# Patient Record
Sex: MALE | Race: WHITE | Employment: OTHER | URBAN - METROPOLITAN AREA
[De-identification: names, ages, dates, MRNs, and addresses within clinical notes are randomized per-mention and may not be internally consistent; named-entity substitution may affect disease eponyms.]

---

## 2017-02-27 ENCOUNTER — TRANSFERRED RECORDS (OUTPATIENT)
Dept: HEALTH INFORMATION MANAGEMENT | Facility: CLINIC | Age: 57
End: 2017-02-27

## 2017-05-23 ENCOUNTER — TELEPHONE (OUTPATIENT)
Dept: FAMILY MEDICINE | Facility: CLINIC | Age: 57
End: 2017-05-23

## 2017-05-23 DIAGNOSIS — K21.9 GASTROESOPHAGEAL REFLUX DISEASE WITHOUT ESOPHAGITIS: ICD-10-CM

## 2017-05-23 DIAGNOSIS — I10 ESSENTIAL HYPERTENSION, BENIGN: ICD-10-CM

## 2017-05-24 NOTE — TELEPHONE ENCOUNTER
Lisinopril      Last Written Prescription Date: 04/28/2016  Last Fill Quantity: 90, # refills: 3  Last Office Visit with [facility], [facility] or [facility] prescribing provider: 04/28/2016       Potassium   Date Value Ref Range Status   04/28/2016 3.9 3.4 - 5.3 mmol/L Final     Creatinine   Date Value Ref Range Status   04/28/2016 0.88 0.66 - 1.25 mg/dL Final     BP Readings from Last 3 Encounters:   04/28/16 130/87   04/23/15 154/87   01/15/15 129/87         Omeprazole      Last Written Prescription Date: 04/28/2016  Last Fill Quantity: 90,  # refills: 3   Last Office Visit with [facility], [facility] or [facility] prescribing provider: 04/28/2016

## 2017-05-30 RX ORDER — LISINOPRIL 20 MG/1
TABLET ORAL
Qty: 30 TABLET | Refills: 0 | Status: SHIPPED | OUTPATIENT
Start: 2017-05-30 | End: 2017-06-29

## 2017-05-30 RX ORDER — OMEPRAZOLE 40 MG/1
CAPSULE, DELAYED RELEASE ORAL
Qty: 30 CAPSULE | Refills: 0 | Status: SHIPPED | OUTPATIENT
Start: 2017-05-30 | End: 2017-06-29

## 2017-05-30 NOTE — TELEPHONE ENCOUNTER
Patient is overdue for an appointment.  Called patient and notified that can send one month refill and then needs follow up in clinic before next refill is needed.    Mili Underwood RN

## 2017-05-30 NOTE — TELEPHONE ENCOUNTER
Pt is calling because he is almost out of medication   Wanting filled ASAP    Kristin Berkowitz  [facility]

## 2017-06-29 DIAGNOSIS — K21.9 GASTROESOPHAGEAL REFLUX DISEASE WITHOUT ESOPHAGITIS: ICD-10-CM

## 2017-06-29 DIAGNOSIS — I10 ESSENTIAL HYPERTENSION, BENIGN: ICD-10-CM

## 2017-06-29 NOTE — TELEPHONE ENCOUNTER
Lisinopril      Last Written Prescription Date: 5/20/2017  Last Fill Quantity: 30, # refills: 0  Last Office Visit with [facility], [facility] or  [facility] prescribing provider: 4/28/2016  Next 5 appointments (look out 90 days)     Jul 07, 2017  2:40 PM CDT   [facility]    [address] [address]   [phone]                   Potassium   Date Value Ref Range Status   04/28/2016 3.9 3.4 - 5.3 mmol/L Final     Creatinine   Date Value Ref Range Status   04/28/2016 0.88 0.66 - 1.25 mg/dL Final     BP Readings from Last 3 Encounters:   04/28/16 130/87   04/23/15 154/87   01/15/15 129/87       Omeprazole      Last Written Prescription Date: 5/30/2017  Last Fill Quantity: 30,  # refills: 0   Last Office Visit with [facility], [facility] or [facility] prescribing provider: 4/28/2016                                         Next 5 appointments (look out 90 days)     Jul 07, 2017  2:40 PM CDT   [facility]    [address] [address]   [phone]                  Pt has an appt set up for 7/7/2017 with Dr. Fitzpatrick

## 2017-06-30 RX ORDER — OMEPRAZOLE 40 MG/1
CAPSULE, DELAYED RELEASE ORAL
Qty: 30 CAPSULE | Refills: 0 | Status: SHIPPED | OUTPATIENT
Start: 2017-06-30 | End: 2017-08-07

## 2017-06-30 RX ORDER — LISINOPRIL 20 MG/1
20 TABLET ORAL DAILY
Qty: 30 TABLET | Refills: 11 | Status: SHIPPED | OUTPATIENT
Start: 2017-06-30 | End: 2017-07-07

## 2017-06-30 NOTE — TELEPHONE ENCOUNTER
Pt is requesting a refill of the below medications. He has already been given the one month tita refill that our refill protocol allows. This refill request will be sent to the provider for review.     Please advise. The pt has scheduled an appt for 7/7/17.   Thank you,  Daya Higgins RN

## 2017-07-07 ENCOUNTER — OFFICE VISIT (OUTPATIENT)
Dept: FAMILY MEDICINE | Facility: CLINIC | Age: 57
End: 2017-07-07
Payer: COMMERCIAL

## 2017-07-07 VITALS
HEIGHT: 72 IN | WEIGHT: 262 LBS | BODY MASS INDEX: 35.49 KG/M2 | DIASTOLIC BLOOD PRESSURE: 74 MMHG | HEART RATE: 97 BPM | SYSTOLIC BLOOD PRESSURE: 118 MMHG

## 2017-07-07 DIAGNOSIS — M17.12 PRIMARY OSTEOARTHRITIS OF LEFT KNEE: ICD-10-CM

## 2017-07-07 DIAGNOSIS — M19.071 PRIMARY OSTEOARTHRITIS OF RIGHT ANKLE: Primary | ICD-10-CM

## 2017-07-07 DIAGNOSIS — I10 ESSENTIAL HYPERTENSION, BENIGN: ICD-10-CM

## 2017-07-07 LAB
CREAT SERPL-MCNC: 0.87 MG/DL (ref 0.66–1.25)
GFR SERPL CREATININE-BSD FRML MDRD: 89 ML/MIN/1.7M2
POTASSIUM SERPL-SCNC: 4.1 MMOL/L (ref 3.4–5.3)

## 2017-07-07 PROCEDURE — 82565 ASSAY OF CREATININE: CPT | Performed by: FAMILY MEDICINE

## 2017-07-07 PROCEDURE — 99214 OFFICE O/P EST MOD 30 MIN: CPT | Mod: 25 | Performed by: FAMILY MEDICINE

## 2017-07-07 PROCEDURE — 20605 DRAIN/INJ JOINT/BURSA W/O US: CPT | Mod: RT | Performed by: FAMILY MEDICINE

## 2017-07-07 PROCEDURE — 84132 ASSAY OF SERUM POTASSIUM: CPT | Performed by: FAMILY MEDICINE

## 2017-07-07 PROCEDURE — 36415 COLL VENOUS BLD VENIPUNCTURE: CPT | Performed by: FAMILY MEDICINE

## 2017-07-07 PROCEDURE — 20610 DRAIN/INJ JOINT/BURSA W/O US: CPT | Mod: LT | Performed by: FAMILY MEDICINE

## 2017-07-07 RX ORDER — TRIAMCINOLONE ACETONIDE 40 MG/ML
40 INJECTION, SUSPENSION INTRA-ARTICULAR; INTRAMUSCULAR ONCE
Qty: 1 ML | Refills: 0 | OUTPATIENT
Start: 2017-07-07 | End: 2017-07-07

## 2017-07-07 RX ORDER — LISINOPRIL 20 MG/1
20 TABLET ORAL DAILY
Qty: 90 TABLET | Refills: 3 | Status: SHIPPED | OUTPATIENT
Start: 2017-07-07 | End: 2018-08-26

## 2017-07-07 NOTE — PROGRESS NOTES
SUBJECTIVE:                                                    Tip Hernandez Jr. is a 57 year old male who presents to clinic today for the following health issues:      Hypertension Follow-up      Outpatient blood pressures were about 115-120/75.     Low Salt Diet: no added salt      Amount of exercise or physical activity: 2-3 days/week for an average of 30-45 minutes    Problems taking medications regularly: No    Medication side effects: none    Diet: regular (no restrictions)      He has a hx of DJD in the left knee and the right ankle. He has had imaging for these at outside facilities.   The pain in these joints has been 9 months ago for the ankle. The knee has been painful off and on for 2 years.   He has used knee pads.     Current Outpatient Prescriptions:      lisinopril (PRINIVIL/ZESTRIL) 20 MG tablet, Take 1 tablet (20 mg) by mouth daily, Disp: 30 tablet, Rfl: 11     omeprazole (PRILOSEC) 40 MG capsule, TAKE ONE CAPSULE BY MOUTH DAILY 30-60 MINUTES BEFO RE A MEAL, Disp: 30 capsule, Rfl: 0     MULTI-DAY VITAMINS OR TABS, 1 daily, Disp: , Rfl:      TYLENOL 500 MG OR TABS, 2 tabs b3placx, Disp: , Rfl:      augmented betamethasone dipropionate (DIPROLENE-AF) 0.05 % cream, Apply thin film to affected area twice daily, as needed. Pt will call to order, Disp: 30 g, Rfl: 10    Patient Active Problem List   Diagnosis     Essential hypertension, benign     Obesity     Hyperlipidemia with target LDL less than 130     RA (rheumatoid arthritis) (H)     Osteoarthritis     Eczema     DJD (degenerative joint disease), lumbar     DJD (degenerative joint disease) of cervical spine     GERD (gastroesophageal reflux disease)       Blood pressure 118/74, pulse 97, height 6' (1.829 m), weight 262 lb (118.8 kg).    Exam:  GENERAL APPEARANCE: healthy, alert and no distress  EYES: EOMI,  PERRL  NECK: no adenopathy, no asymmetry, masses, or scars and thyroid normal to palpation  RESP: lungs clear to auscultation - no  rales, rhonchi or wheezes  CV: regular rates and rhythm, normal S1 S2, no S3 or S4 and no murmur, click or rub -  MS: arthritis of the left knee and the right ankle with swelling and tenderness.   SKIN: no suspicious lesions or rashes  PSYCH: mentation appears normal and affect normal/bright      (I10) Essential hypertension, benign  Comment:   Plan: lisinopril (PRINIVIL/ZESTRIL) 20 MG tablet,         **Creatinine FUTURE anytime, **Potassium FUTURE        anytime        Monitor and record the BP readings at rest and the goal for the average is under 130/80. Use the med and the non drug therapies.   If doing well then refill and recheck annually and do the labs today.     (M17.12) Primary osteoarthritis of left knee  Comment:   Plan: DRAIN/INJECT LARGE JOINT/BURSA        We discussed the options and will use the injection of 1 cc of Kenalog-40, with 1 cc of 1% Lidocaine into the left knee superior and medial compartment.   Modify activities to avoid squatting and kneeling. Use the ice and Tylenol and advil. Follow up as needed.       (M19.071) Primary osteoarthritis of right ankle    Comment:   Plan: DRAIN/INJECT LARGE JOINT/BURSA        See above. The same med is injected sterily into the right ankle, lateral and superior aspect.       Danial Fitzpatrick

## 2017-07-07 NOTE — MR AVS SNAPSHOT
After Visit Summary   7/7/2017    Tip Hernandez Jr.    MRN: [id number]           Patient Information     Date Of Birth          1960        Visit Information        Provider Department      7/7/2017 2:40 PM Danial Fitzpatrick MD [facility]        Today's Diagnoses     Primary osteoarthritis of right ankle    -  1    Essential hypertension, benign        Primary osteoarthritis of left knee          Care Instructions    (I10) Essential hypertension, benign  Comment:   Plan: lisinopril (PRINIVIL/ZESTRIL) 20 MG tablet,         **Creatinine FUTURE anytime, **Potassium FUTURE        anytime        Monitor and record the BP readings at rest and the goal for the average is under 130/80. Use the med and the non drug therapies.   If doing well then refill and recheck annually and do the labs today.     (M17.12) Primary osteoarthritis of left knee  Comment:   Plan: DRAIN/INJECT LARGE JOINT/BURSA        We discussed the options and will use the injection of 1 cc of Kenalog-40, with 1 cc of 1% Lidocaine into the left knee superior and medial compartment.   Modify activities to avoid squatting and kneeling. Use the ice and Tylenol and advil. Follow up as needed.       (M19.071) Primary osteoarthritis of right ankle    Comment:   Plan: DRAIN/INJECT LARGE JOINT/BURSA        See above. The same med is injected sterily into the right ankle, lateral and superior aspect.           Follow-ups after your visit        Future tests that were ordered for you today     Open Future Orders        Priority Expected Expires Ordered    **Creatinine FUTURE anytime Routine 7/7/2017 7/7/2018 7/7/2017    **Potassium FUTURE anytime Routine 7/7/2017 7/7/2018 7/7/2017            Who to contact     If you have questions or need follow up information about today's clinic visit or your schedule please contact [facility] directly at [phone].  Normal or non-critical lab and imaging results will be  "communicated to you by MyChart, letter or phone within 4 business days after the clinic has received the results. If you do not hear from us within 7 days, please contact the clinic through [employer] or phone. If you have a critical or abnormal lab result, we will notify you by phone as soon as possible.  Submit refill requests through [employer] or call your pharmacy and they will forward the refill request to us. Please allow 3 business days for your refill to be completed.          Additional Information About Your Visit        [employer] Information     [employer] lets you send messages to your doctor, view your test results, renew your prescriptions, schedule appointments and more. To sign up, go to [email or web address] . Click on \"Log in\" on the left side of the screen, which will take you to the Welcome page. Then click on \"Sign up Now\" on the right side of the page.     You will be asked to enter the access code listed below, as well as some personal information. Please follow the directions to create your username and password.     Your access code is: [email or web address]  Expires: 10/5/2017  3:01 PM     Your access code will  in 90 days. If you need help or a new code, please call your [place] clinic or [phone].        Care EveryWhere ID     This is your Care EveryWhere ID. This could be used by other organizations to access your [place] medical records  [id number]        Your Vitals Were     Pulse Height BMI (Body Mass Index)             97 6' (1.829 m) 35.53 kg/m2          Blood Pressure from Last 3 Encounters:   17 118/74   16 130/87   04/23/15 154/87    Weight from Last 3 Encounters:   17 262 lb (118.8 kg)   16 270 lb (122.5 kg)   04/23/15 266 lb (120.7 kg)              We Performed the Following     DRAIN/INJECT LARGE JOINT/BURSA     DRAIN/INJECT LARGE JOINT/BURSA          Where to get your medicines      These medications were sent to [id number] - [place], [place] - " [address]  [address], [place] [facility] [address]     Phone:  [phone]     lisinopril 20 MG tablet          Primary Care Provider Office Phone # Fax #    Danial Fitzpatrick [phone]       [facility] [place] [address] [address]        Equal Access to Services     MALLORIE MERCER : Hadii aad ku hadasho Soomaali, waaxda luqadaha, qaybta kaalmada adeegyada, waxay idiin hayaan adeeg kharash la'kranthin ah. So [facility] [phone].    ATENCIÓN: Si habla español, tiene a robin disposición servicios gratuitos de asistencia lingüística. Micki al [phone].    We comply with applicable federal civil rights laws and [place] laws. We do not discriminate on the basis of race, color, national origin, age, disability sex, sexual orientation or gender identity.            Thank you!     Thank you for choosing [facility]  for your care. Our goal is always to provide you with excellent care. Hearing back from our patients is one way we can continue to improve our services. Please take a few minutes to complete the written survey that you may receive in the mail after your visit with us. Thank you!             Your Updated Medication List - Protect others around you: Learn how to safely use, store and throw away your medicines at www.disposemymeds.org.          This list is accurate as of: 7/7/17  3:01 PM.  Always use your most recent med list.                   Brand Name Dispense Instructions for use Diagnosis    augmented betamethasone dipropionate 0.05 % cream    DIPROLENE-AF    30 g    Apply thin film to affected area twice daily, as needed. Pt will call to order    Eczema       lisinopril 20 MG tablet    PRINIVIL/ZESTRIL    90 tablet    Take 1 tablet (20 mg) by mouth daily    Essential hypertension, benign       MULTI-DAY vitamin  S Tabs      1 daily        omeprazole 40 MG capsule    priLOSEC    30 capsule    TAKE ONE CAPSULE BY MOUTH DAILY 30-60  MINUTES BEFO RE A MEAL    Gastroesophageal reflux disease without esophagitis       TYLENOL 500 MG tablet   Generic drug:  acetaminophen      2 tabs [id number]

## 2017-07-07 NOTE — NURSING NOTE
Initial /74 (BP Location: Left arm, Patient Position: Chair, Cuff Size: Adult Large)  Pulse 97  Ht 6' (1.829 m)  Wt 262 lb (118.8 kg)  BMI 35.53 kg/m2 Estimated body mass index is 35.53 kg/(m^2) as calculated from the following:    Height as of this encounter: 6' (1.829 m).    Weight as of this encounter: 262 lb (118.8 kg). .    Lety Harris

## 2017-07-07 NOTE — LETTER
[facility]  [address] [address]  Phone: [phone]    July 10, 2017    Tip Hernandez Jr.  [address] [address]          Dear Mr. Hernandez,    The results of your recent lab tests were within normal limits. If you have any further questions or problems, please contact our office.      Component      Latest Ref Rng & Units 7/7/2017   Creatinine      0.66 - 1.25 mg/dL 0.87   GFR Estimate      >60 mL/min/1.7m2 89   GFR Estimate If Black      >60 mL/min/1.7m2 >90 . . .   Potassium      3.4 - 5.3 mmol/L 4.1             Sincerely,      Danial Fitzpatrick MD / KEI

## 2017-07-07 NOTE — PATIENT INSTRUCTIONS
(I10) Essential hypertension, benign  Comment:   Plan: lisinopril (PRINIVIL/ZESTRIL) 20 MG tablet,         **Creatinine FUTURE anytime, **Potassium FUTURE        anytime        Monitor and record the BP readings at rest and the goal for the average is under 130/80. Use the med and the non drug therapies.   If doing well then refill and recheck annually and do the labs today.     (M17.12) Primary osteoarthritis of left knee  Comment:   Plan: DRAIN/INJECT LARGE JOINT/BURSA        We discussed the options and will use the injection of 1 cc of Kenalog-40, with 1 cc of 1% Lidocaine into the left knee superior and medial compartment.   Modify activities to avoid squatting and kneeling. Use the ice and Tylenol and advil. Follow up as needed.       (M19.071) Primary osteoarthritis of right ankle    Comment:   Plan: DRAIN/INJECT LARGE JOINT/BURSA        See above. The same med is injected sterily into the right ankle, lateral and superior aspect.

## 2017-08-02 DIAGNOSIS — K21.9 GASTROESOPHAGEAL REFLUX DISEASE WITHOUT ESOPHAGITIS: ICD-10-CM

## 2017-08-03 NOTE — TELEPHONE ENCOUNTER
Omeprazole     Last Written Prescription Date: 06/30/17  Last Fill Quantity: 30,  # refills: 0   Last Office Visit with [facility], [facility] or [facility] prescribing provider: 07/07/17

## 2017-08-07 RX ORDER — OMEPRAZOLE 40 MG/1
CAPSULE, DELAYED RELEASE ORAL
Qty: 90 CAPSULE | Refills: 3 | Status: SHIPPED | OUTPATIENT
Start: 2017-08-07 | End: 2018-09-17

## 2017-08-07 RX ORDER — OMEPRAZOLE 40 MG/1
CAPSULE, DELAYED RELEASE ORAL
Qty: 30 CAPSULE | OUTPATIENT
Start: 2017-08-07

## 2017-08-07 NOTE — TELEPHONE ENCOUNTER
Prescription approved per [facility] Refill Protocol.    Lashonda Disla RNC

## 2018-08-26 ENCOUNTER — TELEPHONE (OUTPATIENT)
Dept: FAMILY MEDICINE | Facility: CLINIC | Age: 58
End: 2018-08-26

## 2018-08-26 DIAGNOSIS — I10 ESSENTIAL HYPERTENSION, BENIGN: ICD-10-CM

## 2018-08-27 NOTE — TELEPHONE ENCOUNTER
"Requested Prescriptions   Pending Prescriptions Disp Refills     lisinopril (PRINIVIL/ZESTRIL) 20 MG tablet [Pharmacy Med Name: LISINOPRIL 20MG TAB]  Last Written Prescription Date:  07/07/17  Last Fill Quantity: 90,  # refills: 3   Last office visit: 7/7/2017 with prescribing provider:  07/7/17   Future Office Visit:     90 tablet      Sig: TAKE 1 TABLET BY MOUTH DAILY    ACE Inhibitors (Including Combos) Protocol Failed    8/26/2018  4:53 PM       Failed - Blood pressure under 140/90 in past 12 months    BP Readings from Last 3 Encounters:   07/07/17 118/74   04/28/16 130/87   04/23/15 154/87          Failed - Recent (12 mo) or future (30 days) visit within the authorizing provider's specialty    Patient had office visit in the last 12 months or has a visit in the next 30 days with authorizing provider or within the authorizing provider's specialty.  See \"Patient Info\" tab in inbasket, or \"Choose Columns\" in Meds & Orders section of the refill encounter.           Failed - Normal serum creatinine on file in past 12 months    Recent Labs   Lab Test  07/07/17   1512   CR  0.87          Failed - Normal serum potassium on file in past 12 months    Recent Labs   Lab Test  07/07/17   1512   POTASSIUM  4.1          Passed - Patient is age 18 or older          "

## 2018-08-28 RX ORDER — LISINOPRIL 20 MG/1
TABLET ORAL
Qty: 30 TABLET | Refills: 0 | Status: SHIPPED | OUTPATIENT
Start: 2018-08-28 | End: 2018-10-18

## 2018-08-28 NOTE — TELEPHONE ENCOUNTER
Due for OV and labs.    30 day supply of medication given.    Left message for patient to call back at [phone].  Latisha CORONEL RN

## 2018-09-17 DIAGNOSIS — K21.9 GASTROESOPHAGEAL REFLUX DISEASE WITHOUT ESOPHAGITIS: ICD-10-CM

## 2018-09-17 NOTE — LETTER
[facility]  [address] [facility] [place] [address]  [phone]        September 18, 2018  Tip Hernandez Jr.  [address] [address]    Dear Tip,    I care about your health and have reviewed your health plan. I have reviewed your medical conditions, medication list, and lab results and am making recommendations based on this review, to better manage your health.    You are in particular need of attention regarding:  -Wellness (Physical) Visit     I am recommending that you:  -schedule a WELLNESS (Physical) APPOINTMENT with me.   I will check fasting labs the same day - nothing to eat except water and meds for 8-10 hours prior.    Here is a list of Health Maintenance topics that are due now or due soon:  Health Maintenance Due   Topic Date Due     HIV SCREEN (SYSTEM ASSIGNED)  02/28/1976     HEPATITIS C SCREENING  02/28/1976     ADVANCE DIRECTIVE PLANNING Q5 YRS  02/28/2013     LIPID SCREEN Q5 YR MALE (SYSTEM ASSIGNED)  01/09/2015     PHQ-2 Q1 YR  04/28/2017     INFLUENZA VACCINE (1) 09/01/2018     TETANUS IMMUNIZATION (SYSTEM ASSIGNED)  10/07/2018     Please call us at [phone] (or use [employer]) to address the above recommendations.     Thank you for trusting [facility] and we appreciate the opportunity to serve you.  We look forward to supporting your healthcare needs in the future.    Healthy Regards,  Dr. Fitzpatrick/[facility]

## 2018-09-18 RX ORDER — OMEPRAZOLE 40 MG/1
CAPSULE, DELAYED RELEASE ORAL
Qty: 30 CAPSULE | Refills: 0 | Status: SHIPPED | OUTPATIENT
Start: 2018-09-18 | End: 2018-10-18

## 2018-09-18 NOTE — TELEPHONE ENCOUNTER
"Requested Prescriptions   Pending Prescriptions Disp Refills     omeprazole (PRILOSEC) 40 MG capsule [Pharmacy Med Name: OMEPRAZOLE 40MG DR CAP] 90 capsule      Sig: TAKE ONE CAPSULE BY MOUTH DAILY 30-60 MINUTES BEFORE A MEAL    PPI Protocol Failed    9/17/2018  6:38 PM       Failed - Recent (12 mo) or future (30 days) visit within the authorizing provider's specialty    Patient had office visit in the last 12 months or has a visit in the next 30 days with authorizing provider or within the authorizing provider's specialty.  See \"Patient Info\" tab in inbasket, or \"Choose Columns\" in Meds & Orders section of the refill encounter.           Passed - Not on Clopidogrel (unless Pantoprazole ordered)       Passed - No diagnosis of osteoporosis on record       Passed - Patient is age 18 or older        Last Written Prescription Date:  8/7/17  Last Fill Quantity: 90,  # refills: 3   Last office visit: 7/7/2017 with prescribing provider:  Nanette   Future Office Visit:      "

## 2018-09-18 NOTE — TELEPHONE ENCOUNTER
Medication is being filled for 1 time refill only due to:  Patient needs to be seen because it has been more than one year since last visit.   Letter sent to lacey FARMER RN

## 2018-10-18 ENCOUNTER — OFFICE VISIT (OUTPATIENT)
Dept: FAMILY MEDICINE | Facility: CLINIC | Age: 58
End: 2018-10-18
Payer: COMMERCIAL

## 2018-10-18 VITALS
WEIGHT: 257 LBS | HEIGHT: 70 IN | SYSTOLIC BLOOD PRESSURE: 132 MMHG | TEMPERATURE: 98 F | BODY MASS INDEX: 36.79 KG/M2 | DIASTOLIC BLOOD PRESSURE: 92 MMHG | OXYGEN SATURATION: 96 % | HEART RATE: 78 BPM

## 2018-10-18 DIAGNOSIS — K21.9 GASTROESOPHAGEAL REFLUX DISEASE WITHOUT ESOPHAGITIS: ICD-10-CM

## 2018-10-18 DIAGNOSIS — Z23 NEED FOR VACCINATION: ICD-10-CM

## 2018-10-18 DIAGNOSIS — L91.8 SKIN TAG: ICD-10-CM

## 2018-10-18 DIAGNOSIS — I10 ESSENTIAL HYPERTENSION, BENIGN: Primary | ICD-10-CM

## 2018-10-18 LAB
CREAT SERPL-MCNC: 0.82 MG/DL (ref 0.66–1.25)
GFR SERPL CREATININE-BSD FRML MDRD: >90 ML/MIN/1.7M2
POTASSIUM SERPL-SCNC: 3.9 MMOL/L (ref 3.4–5.3)

## 2018-10-18 PROCEDURE — 36415 COLL VENOUS BLD VENIPUNCTURE: CPT | Performed by: FAMILY MEDICINE

## 2018-10-18 PROCEDURE — 90471 IMMUNIZATION ADMIN: CPT | Performed by: FAMILY MEDICINE

## 2018-10-18 PROCEDURE — 82565 ASSAY OF CREATININE: CPT | Performed by: FAMILY MEDICINE

## 2018-10-18 PROCEDURE — 99214 OFFICE O/P EST MOD 30 MIN: CPT | Mod: 25 | Performed by: FAMILY MEDICINE

## 2018-10-18 PROCEDURE — 11200 RMVL SKIN TAGS UP TO&INC 15: CPT | Performed by: FAMILY MEDICINE

## 2018-10-18 PROCEDURE — 84132 ASSAY OF SERUM POTASSIUM: CPT | Performed by: FAMILY MEDICINE

## 2018-10-18 PROCEDURE — 90715 TDAP VACCINE 7 YRS/> IM: CPT | Performed by: FAMILY MEDICINE

## 2018-10-18 RX ORDER — LISINOPRIL 20 MG/1
20 TABLET ORAL DAILY
Qty: 90 TABLET | Refills: 3 | Status: SHIPPED | OUTPATIENT
Start: 2018-10-18 | End: 2019-11-06

## 2018-10-18 RX ORDER — OMEPRAZOLE 40 MG/1
CAPSULE, DELAYED RELEASE ORAL
Qty: 90 CAPSULE | Refills: 3 | Status: SHIPPED | OUTPATIENT
Start: 2018-10-18 | End: 2019-11-06

## 2018-10-18 NOTE — MR AVS SNAPSHOT
After Visit Summary   10/18/2018    Tip Hernandez Jr.    MRN: [id number]           Patient Information     Date Of Birth          1960        Visit Information        Provider Department      10/18/2018 3:00 PM Danial Fitzpatrick MD [facility]        Today's Diagnoses     Essential hypertension, benign    -  1    Gastroesophageal reflux disease without esophagitis        Skin tag          Care Instructions          Thank you for choosing [facility].  You may be receiving a survey in the mail from [facility] your [facility] today.  Please take a few minutes to complete and return the survey to let us know how we are doing.      If you have questions or concerns, please contact us via at[employer]estars or you can contact your care team at [phone].    Our Clinic hours are:  Monday 6:40 am  to 7:00 pm  Tuesday -Friday 6:40 am to 5:00 pm    The [place] outpatient lab hours are:  Monday - Friday 6:10 am to 4:45 pm  Saturdays 7:00 am to 11:00 am  Appointments are required, call [phone]    If you have clinical questions after hours or would like to schedule an appointment,  call the clinic at [phone].      (I10) Essential hypertension, benign  (primary encounter diagnosis)  Comment:   Plan: lisinopril (PRINIVIL/ZESTRIL) 20 MG tablet,         Creatinine, Potassium        For the blood pressure monitor and record this at rest and the goal for the average should be under 130/80.   Use the med and the non drug therapies. If the BP is high then we will increase the dose of Lisinopril to 40 mg daily.   Just call our clinic RN at [phone]. If doing well then refill and recheck annually. The labs are done annually.     (K21.9) Gastroesophageal reflux disease without esophagitis  Comment:   Plan: omeprazole (PRILOSEC) 40 MG capsule        Use the med daily and the dietary and mechanical instructions. The goal for the symptoms is to be zero.   If doing well then refill and  "recheck annually.     (L91.8) Skin tag  Comment:   Plan: DESTRUCT BENIGN LESION, UP TO 14        We will excise the skin tag and use Betadine. Follow up as needed.       We discussed the flu shot and he is considering it. If desired, call [phone] for my RN.           Follow-ups after your visit        Who to contact     If you have questions or need follow up information about today's clinic visit or your schedule please contact [facility] directly at [phone].  Normal or non-critical lab and imaging results will be communicated to you by MyChart, letter or phone within 4 business days after the clinic has received the results. If you do not hear from us within 7 days, please contact the clinic through MyChart or phone. If you have a critical or abnormal lab result, we will notify you by phone as soon as possible.  Submit refill requests through [employer] or call your pharmacy and they will forward the refill request to us. Please allow 3 business days for your refill to be completed.          Additional Information About Your Visit        Care EveryWhere ID     This is your Care EveryWhere ID. This could be used by other organizations to access your [facility] medical records  [id number]        Your Vitals Were     Pulse Temperature Height Pulse Oximetry BMI (Body Mass Index)       78 98  F (36.7  C) (Tympanic) 5' 10\" (1.778 m) 96% 36.88 kg/m2        Blood Pressure from Last 3 Encounters:   10/18/18 (!) 132/92   07/07/17 118/74   04/28/16 130/87    Weight from Last 3 Encounters:   10/18/18 257 lb (116.6 kg)   07/07/17 262 lb (118.8 kg)   04/28/16 270 lb (122.5 kg)              We Performed the Following     Creatinine     DESTRUCT BENIGN LESION, UP TO 14     Potassium          Today's Medication Changes          These changes are accurate as of 10/18/18  3:56 PM.  If you have any questions, ask your nurse or doctor.               These medicines have changed or have updated prescriptions.        " Dose/Directions    lisinopril 20 MG tablet   Commonly known as:  PRINIVIL/ZESTRIL   This may have changed:  See the new instructions.   Used for:  Essential hypertension, benign   Changed by:  Danial Fitzpatrick MD        Dose:  20 mg   Take 1 tablet (20 mg) by mouth daily   Quantity:  90 tablet   Refills:  3            Where to get your medicines      These medications were sent to [id number] - [place], [place] - [address]  [address], [facility] [address]     Phone:  [phone]     lisinopril 20 MG tablet    omeprazole 40 MG capsule                Primary Care Provider Office Phone # Fax #    Danial Fitzpatrick [phone] [address] [address]        Equal Access to Services     MALLORIE MERCER : Hadii kashif ashton hadasho Soomaali, waaxda luqadaha, qaybta kaalmada adeegyada, kennedy ambriz . So [facility] [phone].    ATENCIÓN: Si habla español, tiene a robin disposición servicios gratuitos de asistencia lingüística. Llame al [phone].    We comply with applicable federal civil rights laws and [place] laws. We do not discriminate on the basis of race, color, national origin, age, disability, sex, sexual orientation, or gender identity.            Thank you!     Thank you for choosing [facility]  for your care. Our goal is always to provide you with excellent care. Hearing back from our patients is one way we can continue to improve our services. Please take a few minutes to complete the written survey that you may receive in the mail after your visit with us. Thank you!             Your Updated Medication List - Protect others around you: Learn how to safely use, store and throw away your medicines at www.disposemymeds.org.          This list is accurate as of 10/18/18  3:56 PM.  Always use your most recent med list.                   Brand Name Dispense Instructions for use Diagnosis     lisinopril 20 MG tablet    PRINIVIL/ZESTRIL    90 tablet    Take 1 tablet (20 mg) by mouth daily    Essential hypertension, benign       MULTI-DAY vitamin  S Tabs      1 daily        omeprazole 40 MG capsule    priLOSEC    90 capsule    TAKE ONE CAPSULE BY MOUTH DAILY 30-60 MINUTES BEFORE A MEAL    Gastroesophageal reflux disease without esophagitis       TYLENOL 500 MG tablet   Generic drug:  acetaminophen      2 tabs [id number]

## 2018-10-18 NOTE — PROGRESS NOTES
"  SUBJECTIVE:   Tip Hernandez Jr. is a 58 year old male who presents to clinic today for the following health issues:      Hypertension Follow-up      Outpatient blood pressures are not being checked in the past two months.    Low Salt Diet: no added salt      Medication Followup of Reflux    Taking Medication as prescribed: yes    Side Effects:  None    Medication Helping Symptoms:  Yes, there are some days where he doesn't need to take the medication.        Amount of exercise or physical activity: 3 days per week.    Problems taking medications regularly: No    Medication side effects: none    Diet: No added salt.      Current Outpatient Prescriptions:      lisinopril (PRINIVIL/ZESTRIL) 20 MG tablet, TAKE 1 TABLET BY MOUTH DAILY, Disp: 30 tablet, Rfl: 0     MULTI-DAY VITAMINS OR TABS, 1 daily, Disp: , Rfl:      omeprazole (PRILOSEC) 40 MG capsule, TAKE ONE CAPSULE BY MOUTH DAILY 30-60 MINUTES BEFORE A MEAL, Disp: 30 capsule, Rfl: 0     TYLENOL 500 MG OR TABS, 2 tabs [id number], Disp: , Rfl:     Patient Active Problem List   Diagnosis     Essential hypertension, benign     Obesity     Hyperlipidemia with target LDL less than 130     RA (rheumatoid arthritis) (H)     Osteoarthritis     Eczema     DJD (degenerative joint disease), lumbar     DJD (degenerative joint disease) of cervical spine     GERD (gastroesophageal reflux disease)     Primary osteoarthritis of right ankle     Primary osteoarthritis of left knee       Blood pressure (!) 132/92, pulse 78, temperature 98  F (36.7  C), temperature source Tympanic, height 5' 10\" (1.778 m), weight 257 lb (116.6 kg), SpO2 96 %.    Exam:  GENERAL APPEARANCE: healthy, alert and no distress  EYES: EOMI,  PERRL  NECK: no adenopathy, no asymmetry, masses, or scars and thyroid normal to palpation  RESP: lungs clear to auscultation - no rales, rhonchi or wheezes  CV: regular rates and rhythm, normal S1 S2, no S3 or S4 and no murmur, click or rub -  SKIN: he has a brown and " raised 2 mm skin tag on the right anterior neck.   PSYCH: mentation appears normal and affect normal/bright    (I10) Essential hypertension, benign  (primary encounter diagnosis)  Comment:   Plan: lisinopril (PRINIVIL/ZESTRIL) 20 MG tablet,         Creatinine, Potassium        For the blood pressure monitor and record this at rest and the goal for the average should be under 130/80.   Use the med and the non drug therapies. If the BP is high then we will increase the dose of Lisinopril to 40 mg daily.   Just call our clinic RN at [phone]. If doing well then refill and recheck annually. The labs are done annually.     (K21.9) Gastroesophageal reflux disease without esophagitis  Comment:   Plan: omeprazole (PRILOSEC) 40 MG capsule        Use the med daily and the dietary and mechanical instructions. The goal for the symptoms is to be zero.   If doing well then refill and recheck annually.     (L91.8) Skin tag  Comment:   Plan: DESTRUCT BENIGN LESION, UP TO 14        We will excise the skin tag and use Betadine. Follow up as needed.       We discussed the flu shot and he is considering it. If desired, call [phone] for my RN.

## 2018-10-18 NOTE — PATIENT INSTRUCTIONS
Thank you for choosing [facility].  You may be receiving a survey in the mail from [employer] regarding your visit today.  Please take a few minutes to complete and return the survey to let us know how we are doing.      If you have questions or concerns, please contact us via [employer] or you can contact your care team at [phone].    Our Clinic hours are:  Monday 6:40 am  to 7:00 pm  Tuesday -Friday 6:40 am to 5:00 pm    The [place] outpatient lab hours are:  Monday - Friday 6:10 am to 4:45 pm  Saturdays 7:00 am to 11:00 am  Appointments are required, call [phone]    If you have clinical questions after hours or would like to schedule an appointment,  call the clinic at [phone].      (I10) Essential hypertension, benign  (primary encounter diagnosis)  Comment:   Plan: lisinopril (PRINIVIL/ZESTRIL) 20 MG tablet,         Creatinine, Potassium        For the blood pressure monitor and record this at rest and the goal for the average should be under 130/80.   Use the med and the non drug therapies. If the BP is high then we will increase the dose of Lisinopril to 40 mg daily.   Just call our clinic RN at [phone]. If doing well then refill and recheck annually. The labs are done annually.     (K21.9) Gastroesophageal reflux disease without esophagitis  Comment:   Plan: omeprazole (PRILOSEC) 40 MG capsule        Use the med daily and the dietary and mechanical instructions. The goal for the symptoms is to be zero.   If doing well then refill and recheck annually.     (L91.8) Skin tag  Comment:   Plan: DESTRUCT BENIGN LESION, UP TO 14        We will excise the skin tag and use Betadine. Follow up as needed.       We discussed the flu shot and he is considering it. If desired, call [phone] for my RN.

## 2018-10-18 NOTE — LETTER
[facility]  [address]  [facility] [address]  Phone: [phone]      10/19/2018     Tip Hernandez Jr.  [address] [address]      Dear Tip:    Thank you for allowing me to participate in your care. Your recent test results were reviewed and listed below.      Please inform the patient of this normal test   Danial Fitzpatrick                Results for orders placed or performed in visit on 10/18/18   Creatinine   Result Value Ref Range    Creatinine 0.82 0.66 - 1.25 mg/dL    GFR Estimate >90 >60 mL/min/1.7m2    GFR Estimate If Black >90 >60 mL/min/1.7m2   Potassium   Result Value Ref Range    Potassium 3.9 3.4 - 5.3 mmol/L             Thank you for choosing [facility]. As a result, please continue with the treatment plan discussed in the office. Return as discussed or sooner if symptoms worsen or fail to improve.     If you have any further questions or concerns, please do not hesitate to contact us.      Sincerely,        Dr Fitzpatrick

## 2019-03-28 ENCOUNTER — TELEPHONE (OUTPATIENT)
Dept: FAMILY MEDICINE | Facility: CLINIC | Age: 59
End: 2019-03-28

## 2019-03-28 NOTE — TELEPHONE ENCOUNTER
Panel Management Review      Patient has the following on his problem list:   Patient Active Problem List   Diagnosis     Essential hypertension, benign     Obesity     Hyperlipidemia with target LDL less than 130     RA (rheumatoid arthritis) (H)     Osteoarthritis     Eczema     DJD (degenerative joint disease), lumbar     DJD (degenerative joint disease) of cervical spine     GERD (gastroesophageal reflux disease)     Primary osteoarthritis of right ankle     Primary osteoarthritis of left knee       Composite cancer screening  Chart review shows that this patient is due/due soon for the following   Health Maintenance   Topic Date Due     HIV SCREEN (SYSTEM ASSIGNED)  02/28/1976     HEPATITIS C SCREENING  02/28/1976     ZOSTER IMMUNIZATION (1 of 2) 02/28/2008     ADVANCE DIRECTIVE PLANNING Q5 YRS  02/28/2013     PREVENTIVE CARE VISIT  08/07/2014     LIPID SCREEN Q5 YR MALE (SYSTEM ASSIGNED)  01/09/2015     PHQ-2 Q1 YR  04/28/2017     INFLUENZA VACCINE (1) 09/01/2018     COLON CANCER SCREEN (SYSTEM ASSIGNED)  01/27/2024     DTAP/TDAP/TD IMMUNIZATION (3 - Td) 10/18/2028     IPV IMMUNIZATION  Aged Out     MENINGITIS IMMUNIZATION  Aged Out     Summary:    Patient is due/failing the following:   Annual PE with labs    Action needed:   Patient needs office visit.    Type of outreach:    Sent letter.    Questions for provider review:    None                                                                                                                                    Manjula Berry MA     Chart routed to none .

## 2019-11-06 DIAGNOSIS — K21.9 GASTROESOPHAGEAL REFLUX DISEASE WITHOUT ESOPHAGITIS: ICD-10-CM

## 2019-11-06 DIAGNOSIS — I10 ESSENTIAL HYPERTENSION, BENIGN: ICD-10-CM

## 2019-11-07 RX ORDER — OMEPRAZOLE 40 MG/1
CAPSULE, DELAYED RELEASE ORAL
Qty: 30 CAPSULE | Refills: 0 | Status: SHIPPED | OUTPATIENT
Start: 2019-11-07 | End: 2019-11-20

## 2019-11-07 RX ORDER — LISINOPRIL 20 MG/1
TABLET ORAL
Qty: 30 TABLET | Refills: 0 | Status: SHIPPED | OUTPATIENT
Start: 2019-11-07 | End: 2019-11-20

## 2019-11-07 NOTE — TELEPHONE ENCOUNTER
Medications are being filled for 1 time refill only due to:  Patient needs labs see below.. Patient needs to be seen because last OV with Dr. Fitzpatrick was 10/18/18..     Called pt, scheduled for OV on 11/20/19.    Anabel FARMER RN

## 2019-11-07 NOTE — TELEPHONE ENCOUNTER
"Requested Prescriptions   Pending Prescriptions Disp Refills     lisinopril (PRINIVIL/ZESTRIL) 20 MG tablet [Pharmacy Med Name: LISINOPRIL 20MG TABLET] 90 tablet 3     Sig: TAKE 1 TABLET BY MOUTH DAILY       ACE Inhibitors (Including Combos) Protocol Failed - 11/6/2019  5:46 PM        Failed - Blood pressure under 140/90 in past 12 months     BP Readings from Last 3 Encounters:   10/18/18 (!) 132/92   07/07/17 118/74   04/28/16 130/87                 Failed - Recent (12 mo) or future (30 days) visit within the authorizing provider's specialty     Patient has had an office visit with the authorizing provider or a provider within the authorizing providers department within the previous 12 mos or has a future within next 30 days. See \"Patient Info\" tab in inbasket, or \"Choose Columns\" in Meds & Orders section of the refill encounter.              Failed - Normal serum creatinine on file in past 12 months     Recent Labs   Lab Test 10/18/18  1603   CR 0.82             Failed - Normal serum potassium on file in past 12 months     Recent Labs   Lab Test 10/18/18  1603   POTASSIUM 3.9             Passed - Medication is active on med list        Passed - Patient is age 18 or older   Last Written Prescription Date:  10/18/18  Last Fill Quantity: 90,  # refills: 3   Last office visit: 10/18/2018 with prescribing provider:  Nanette   Future Office Visit:         omeprazole (PRILOSEC) 40 MG DR capsule [Pharmacy Med Name: OMEPRAZOLE 40MG DR CAPSULE] 90 capsule 3     Sig: TAKE 1 CAPSULE BY MOUTH ONCE DAILY 30-60 MINUTES BEFORE A MEAL       PPI Protocol Failed - 11/6/2019  5:46 PM        Failed - Recent (12 mo) or future (30 days) visit within the authorizing provider's specialty     Patient has had an office visit with the authorizing provider or a provider within the authorizing providers department within the previous 12 mos or has a future within next 30 days. See \"Patient Info\" tab in inbasket, or \"Choose Columns\" in Meds & " Orders section of the refill encounter.              Passed - Not on Clopidogrel (unless Pantoprazole ordered)        Passed - No diagnosis of osteoporosis on record        Passed - Medication is active on med list        Passed - Patient is age 18 or older        Last Written Prescription Date:  10/18/18  Last Fill Quantity: 90,  # refills: 3   Last office visit: 10/18/2018 with prescribing provider:  Tosteson   Future Office Visit:

## 2019-11-15 ENCOUNTER — TELEPHONE (OUTPATIENT)
Dept: FAMILY MEDICINE | Facility: CLINIC | Age: 59
End: 2019-11-15

## 2019-11-15 NOTE — TELEPHONE ENCOUNTER
Left message for patient to return call to clinic.  Patient will need to schedule a separate appt for the injection.    Tesha GUILLAUME RN

## 2019-11-15 NOTE — TELEPHONE ENCOUNTER
Reason for Call: Request for an order or referral:    Order or referral being requested: Pt calling stating he has an appt 11/20 and is requesting a Cortizone injection for left knee and shoulder at appt, please advise    Date needed: before my next appointment    Has the patient been seen by the PCP for this problem? YES    Additional comments:     Phone number Patient can be reached at:  Cell number on file:    Telephone Information:   Mobile [phone]       Best Time:  any    Can we leave a detailed message on this number?  YES    Call taken on 11/15/2019 at 1:11 PM by Mary Castelan

## 2019-11-20 ENCOUNTER — OFFICE VISIT (OUTPATIENT)
Dept: FAMILY MEDICINE | Facility: CLINIC | Age: 59
End: 2019-11-20
Payer: COMMERCIAL

## 2019-11-20 VITALS
OXYGEN SATURATION: 94 % | TEMPERATURE: 97.8 F | BODY MASS INDEX: 38.22 KG/M2 | RESPIRATION RATE: 20 BRPM | HEART RATE: 86 BPM | DIASTOLIC BLOOD PRESSURE: 88 MMHG | WEIGHT: 267 LBS | SYSTOLIC BLOOD PRESSURE: 132 MMHG | HEIGHT: 70 IN

## 2019-11-20 DIAGNOSIS — K21.9 GASTROESOPHAGEAL REFLUX DISEASE WITHOUT ESOPHAGITIS: ICD-10-CM

## 2019-11-20 DIAGNOSIS — M47.812 OSTEOARTHRITIS OF CERVICAL SPINE, UNSPECIFIED SPINAL OSTEOARTHRITIS COMPLICATION STATUS: Primary | ICD-10-CM

## 2019-11-20 DIAGNOSIS — M17.12 PRIMARY OSTEOARTHRITIS OF LEFT KNEE: ICD-10-CM

## 2019-11-20 DIAGNOSIS — I10 ESSENTIAL HYPERTENSION, BENIGN: ICD-10-CM

## 2019-11-20 PROCEDURE — 20610 DRAIN/INJ JOINT/BURSA W/O US: CPT | Mod: LT | Performed by: FAMILY MEDICINE

## 2019-11-20 PROCEDURE — 99214 OFFICE O/P EST MOD 30 MIN: CPT | Mod: 25 | Performed by: FAMILY MEDICINE

## 2019-11-20 RX ORDER — OMEPRAZOLE 40 MG/1
CAPSULE, DELAYED RELEASE ORAL
Qty: 90 CAPSULE | Refills: 3 | Status: SHIPPED | OUTPATIENT
Start: 2019-11-20 | End: 2020-09-24

## 2019-11-20 RX ORDER — TRIAMCINOLONE ACETONIDE 40 MG/ML
40 INJECTION, SUSPENSION INTRA-ARTICULAR; INTRAMUSCULAR ONCE
Status: CANCELLED | OUTPATIENT
Start: 2019-11-20 | End: 2019-11-20

## 2019-11-20 RX ORDER — LISINOPRIL 20 MG/1
20 TABLET ORAL DAILY
Qty: 90 TABLET | Refills: 3 | Status: SHIPPED | OUTPATIENT
Start: 2019-11-20 | End: 2020-11-12

## 2019-11-20 RX ORDER — TRIAMCINOLONE ACETONIDE 40 MG/ML
40 INJECTION, SUSPENSION INTRA-ARTICULAR; INTRAMUSCULAR ONCE
Status: COMPLETED | OUTPATIENT
Start: 2019-11-20 | End: 2019-11-20

## 2019-11-20 RX ADMIN — TRIAMCINOLONE ACETONIDE 40 MG: 40 INJECTION, SUSPENSION INTRA-ARTICULAR; INTRAMUSCULAR at 15:00

## 2019-11-20 ASSESSMENT — MIFFLIN-ST. JEOR: SCORE: 2022.35

## 2019-11-20 NOTE — PROGRESS NOTES
Subjective     Tip Hernandez Jr. is a 59 year old male who presents to clinic today for the following health issues:    HPI   Hypertension Follow-up      Do you check your blood pressure regularly outside of the clinic? Yes, off and on.    Are you following a low salt diet? Yes, not adding extra salt.    Are your blood pressures ever more than 140 on the top number (systolic) OR more   than 90 on the bottom number (diastolic), for example 140/90? No, usually is around 118-89.      How many servings of fruits and vegetables do you eat daily?  1 every 2 days.    On average, how many sweetened beverages do you drink each day (soda, juice, sweet tea, etc)?   0    How many days per week do you miss taking your medication? 0    Medication Followup of Reflux    Taking Medication as prescribed: yes    Side Effects:  None    Medication Helping Symptoms:  yes       Joint Pain    Onset: July, 2019.    Description:   Location: left shoulder, then is started to radiate up into the neck.  He was going to the Chiropractor and then had the MRI of the neck done through [facility].  He had an injection done in the neck after the MRI.  Seems to still be having the shoulder pain.  Character: Sharp    Intensity: 8/10    Progression of Symptoms: same-not getting better.    Accompanying Signs & Symptoms:  Other symptoms: radiation of pain to the lower neck.  States his MRI showed a bone spur and arthritis.    History:   Previous similar pain: no       Precipitating factors:   Trauma or overuse: no; he can have increased pain with motion of the neck.     Alleviating factors:  Improved by: Tylenol and ibuprofen will help some but the pain returns.  Worsened by sitting.    Therapies Tried and outcome: See above for the Tylenol and Ibuprofen. He had an epidural steroid in the neck two months ago. That was not effective.         Joint Pain    Onset: History of left knee pain, worse in the past few months.    Description:   Location: Left knee  "pain.  Character: Sharp-pain is not always there.    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     Alleviating factors:  Improved by: Will use Biofreeze at times and that will help.    Therapies Tried and outcome: Steroid injections in the past that help. He uses advil and massage and a pressure wrap.       Current Outpatient Medications:      lisinopril (PRINIVIL/ZESTRIL) 20 MG tablet, TAKE 1 TABLET BY MOUTH DAILY, Disp: 30 tablet, Rfl: 0     MULTI-DAY VITAMINS OR TABS, 1 daily, Disp: , Rfl:      omeprazole (PRILOSEC) 40 MG DR capsule, TAKE 1 CAPSULE BY MOUTH ONCE DAILY 30-60 MINUTES BEFORE A MEAL, Disp: 30 capsule, Rfl: 0     TYLENOL 500 MG OR TABS, 2 tabs [id number], Disp: , Rfl:     Patient Active Problem List   Diagnosis     Essential hypertension, benign     Obesity     Hyperlipidemia with target LDL less than 130     RA (rheumatoid arthritis) (H)     Osteoarthritis     Eczema     DJD (degenerative joint disease), lumbar     DJD (degenerative joint disease) of cervical spine     GERD (gastroesophageal reflux disease)     Primary osteoarthritis of right ankle     Primary osteoarthritis of left knee       Blood pressure 132/88, pulse 86, temperature 97.8  F (36.6  C), temperature source Tympanic, resp. rate 20, height 1.778 m (5' 10\"), weight 121.1 kg (267 lb), SpO2 94 %.    Exam:  GENERAL APPEARANCE: healthy, alert and no distress  EYES: EOMI,  PERRL  CV: regular rates and rhythm, normal S1 S2, no S3 or S4 and no murmur, click or rub -  MS: normal range of motion the shoulders; and decreased range of motion of the neck and the left knee is tender medially at the joint line.   SKIN: no suspicious lesions or rashes  PSYCH: mentation appears normal and affect normal/bright      (M47.812) Osteoarthritis of cervical spine, " unspecified spinal osteoarthritis complication status  (primary encounter diagnosis)  Comment:   Plan: PAIN MANAGEMENT REFERRAL        We discussed the arthritis in the neck and the first cortisone shot was not effective. We discussed the options and the referral to Pain Management is done and call for this appt.   Ask about whether then can access the recent MRI at [facility]. If not better after the second steroid shot then call our RN at [phone] and the referral to spine surgery will be done.   Modify activities to avoid repetitive motions of the neck.     (I10) Essential hypertension, benign  Comment:   Plan: lisinopril (PRINIVIL/ZESTRIL) 20 MG tablet        For the BP use the non drug therapies. We discussed the med and Lisinopril at 20 mg daily is refilled.   The labs were normal recently.     (K21.9) Gastroesophageal reflux disease without esophagitis  Comment:   Plan: omeprazole (PRILOSEC) 40 MG DR capsule        For the heartburn the Prilosec at 40 mg daily is refilled for one year. Find the lowest effective dose. Use the dietary and mechanical instructions.   The goal for the symptoms is to be zero. If doing well then refill and recheck annually.     (M17.12) Primary osteoarthritis of left knee  Comment:   Plan: DRAIN/INJECT LARGE JOINT/BURSA, triamcinolone         (KENALOG-40) injection 40 mg        We discussed the options and will sterily inject injection of 1 cc of Kenalog-40, with 1 cc of 1% Lidocaine into the left knee medial and superior compartment.   Use ice and advil and Tylenol as discussed. If not better then you need an MRI of the knee and call our RN.       Danial Fitzpatrick MD

## 2019-11-20 NOTE — PATIENT INSTRUCTIONS
(M47.812) Osteoarthritis of cervical spine, unspecified spinal osteoarthritis complication status  (primary encounter diagnosis)  Comment:   Plan: PAIN MANAGEMENT REFERRAL        We discussed the arthritis in the neck and the first cortisone shot was not effective. We discussed the options and the referral to Pain Management is done and call for this appt.   Ask about whether then can access the recent MRI at [facility]. If not better after the second steroid shot then call our RN at [phone] and the referral to spine surgery will be done.   Modify activities to avoid repetitive motions of the neck.     (I10) Essential hypertension, benign  Comment:   Plan: lisinopril (PRINIVIL/ZESTRIL) 20 MG tablet        For the BP use the non drug therapies. We discussed the med and Lisinopril at 20 mg daily is refilled.   The labs were normal recently.     (K21.9) Gastroesophageal reflux disease without esophagitis  Comment:   Plan: omeprazole (PRILOSEC) 40 MG DR capsule        For the heartburn the Prilosec at 40 mg daily is refilled for one year. Find the lowest effective dose. Use the dietary and mechanical instructions.   The goal for the symptoms is to be zero. If doing well then refill and recheck annually.     (M17.12) Primary osteoarthritis of left knee  Comment:   Plan: DRAIN/INJECT LARGE JOINT/BURSA, triamcinolone         (KENALOG-40) injection 40 mg        We discussed the options and will sterily inject injection of 1 cc of Kenalog-40, with 1 cc of 1% Lidocaine into the left knee medial and superior compartment.   Use ice and advil and Tylenol as discussed. If not better then you need an MRI of the knee and call our RN.

## 2019-11-21 ENCOUNTER — TELEPHONE (OUTPATIENT)
Dept: PALLIATIVE MEDICINE | Facility: CLINIC | Age: 59
End: 2019-11-21

## 2019-12-04 NOTE — TELEPHONE ENCOUNTER
Pre-screening Questions for Radiology Injections:    Injection to be done at which interventional clinic site? [facility]    Instruct patient to arrive as directed prior to the scheduled appointment time:    [place] minutes before      [place]: 30 minutes before; if IV needed 1 hour before     Procedure ordered by Nanette    Procedure ordered? EDUARDO      Transforaminal Cervical GUSTAVO - Dr. Violette Farrell ONLY    What insurance would patient like us to bill for this procedure? [employer]      Worker's comp or MVA (motor vehicle accident) -Any injection DO NOT SCHEDULE and route to Kat Crane.      [employer] insurance - For SI joint injections, DO NOT SCHEDULE and route Kat Crane.       [employer] - Any injection besides hip/shoulder/knee joint DO NOT SCHEDULE and route to Kat Crane. She will obtain PA and call pt back to schedule procedure or notify pt of denial.       [employer]-Route to Kat for review      **BCBS- ALL need to be routed to [place] for review if a PA is needed**      IF SCHEDULING IN [place] AND NEEDS A PA, IT IS OKAY TO SCHEDULE. [place] HANDLES THEIR OWN PA'S AFTER THE PATIENT IS SCHEDULED. PLEASE SCHEDULE AT LEAST 1 WEEK OUT SO A PA CAN BE OBTAINED.    Any chance of pregnancy? NO   If YES, do NOT schedule and route to RN pool    Is an  needed? No     Patient has a drive home? (mandatory) YES: ok    Is patient taking any blood thinners (plavix, coumadin, jantoven, warfarin, heparin, pradaxa or dabigatran )? No   If hold needed, do NOT schedule, route to RN pool     Is patient taking any aspirin products (includes Excedrin and Fiorinal)? No     If more than 325mg/day do NOT schedule; route to RN pool     For CERVICAL procedures, hold all aspirin products for 6 days.     Tell pt that if aspirin product is not held for 6 days, the procedure WILL BE cancelled.      Does the patient have a bleeding or clotting disorder? No     If YES, okay to schedule AND  route to RN nurse pool    For any patients with platelet count <100, must be forwarded to provider    Is patient diabetic?  No  If YES, instruct them to bring their glucometer.    Does patient have an active infection or treated for one within the past week? No     Is patient currently taking any antibiotics?  No     For patients on chronic, preventative, or prophylactic antibiotics, procedures may be scheduled.     For patients on antibiotics for active or recent infection:antibiotic course must have been completed for 4 days    Is patient currently taking any steroid medications? (i.e. Prednisone, Medrol)  No     For patients on steroid medications, course must have been completed for 4 days    Reviewed with patient:  If you are started on any steroids or antibiotics between now and your appointment, you must contact us because the procedure may need to be cancelled.  Yes    Is patient actively being treated for cancer or immunocompromised? No  If YES, do NOT schedule and route to RN pool     Are you able to get on and off an exam table with minimal or no assistance? Yes  If NO, do NOT schedule and route to RN pool    Are you able to roll over and lay on your stomach with minimal or no assistance? Yes  If NO, do NOT schedule and route to RN pool     Any allergies to contrast dye, iodine, shellfish, or numbing and steroid medications? No  If YES, route to RN pool AND add allergy information to appointment notes    Allergies: Patient has no known allergies.      Has the patient had a flu shot or any other vaccinations within 7 days before or after the procedure.  No     Does patient have an MRI/CT? YES MRI Check Procedure Scheduling Grid to see if required.      Was the MRI done within the last 3 years?  No [facility] pt will have sent over    If yes, where was the MRI done i.e.[facility], [facility], [place], [facility] etc? No [facility]      If no, do not schedule and route to RN pool    If MRI was not done at  [place], [facility] or [facility] do NOT schedule and route to RN pool.      If pt has an imaging disc, the injection MAY be scheduled but pt has to bring disc to appt.     If they show up without the disc the injection cannot be done    Reminders (please tell patient if applicable):       Instructed pt to arrive 30 minutes early for IV start if required. (Check Procedure Scheduling Grid)  YES: ok      If celiac plexus block, informed patient NPO for 6 hours and that it is okay to take medications with sips of water, especially blood pressure medications  Not Applicable         If this is for a cervical procedure, informed patient that aspirin needs to be held for 6 days.   NO      For all patients not having spinal cord stimulator (SCS) trials or radiofrequency ablations (RFAs), informed patient:    IV sedation is not provided for this procedure.  If you feel that an oral anti-anxiety medication is needed, you can discuss this further with your referring provider or primary care provider.  The Pain Clinic provider will discuss specifics of what the procedure includes at your appointment.  Most procedures last 10-20 minutes.  We use numbing medications to help with any discomfort during the procedure.  Not Applicable      Do not schedule procedures requiring IV placement in the first appointment of the day or first appointment after lunch. Do NOT schedule at 0745, 0815 or 1245. ok      For patients 85 or older we recommend having an adult stay w/ them for the remainder of the day.   ok    Does the patient have any questions?  NO  Deisy Mak  [facility]

## 2019-12-04 NOTE — TELEPHONE ENCOUNTER
Message sent to the [place] x-ray techs to have the imaging pushed into PACS.     Meme Titus RN-BSN  [facility]

## 2019-12-05 NOTE — TELEPHONE ENCOUNTER
MRI images have been pushed into our system.  Please add this to the appointment notes.  Routed to scheduling coordinators to call pt to schedule a cervical epidural steroid injection.    Meme Titus RN-BSN  [facility] [place]  .

## 2019-12-05 NOTE — TELEPHONE ENCOUNTER
Parmjit to schedule EDUARDO De [place]    [facility]

## 2019-12-13 ENCOUNTER — ANCILLARY PROCEDURE (OUTPATIENT)
Dept: RADIOLOGY | Facility: CLINIC | Age: 59
End: 2019-12-13
Attending: PAIN MEDICINE
Payer: COMMERCIAL

## 2019-12-13 ENCOUNTER — RADIOLOGY INJECTION OFFICE VISIT (OUTPATIENT)
Dept: PALLIATIVE MEDICINE | Facility: CLINIC | Age: 59
End: 2019-12-13
Payer: COMMERCIAL

## 2019-12-13 VITALS — DIASTOLIC BLOOD PRESSURE: 90 MMHG | SYSTOLIC BLOOD PRESSURE: 134 MMHG | OXYGEN SATURATION: 98 % | HEART RATE: 88 BPM

## 2019-12-13 DIAGNOSIS — M54.12 CERVICAL RADICULOPATHY: Primary | ICD-10-CM

## 2019-12-13 DIAGNOSIS — M54.12 CERVICAL RADICULOPATHY: ICD-10-CM

## 2019-12-13 PROCEDURE — 62321 NJX INTERLAMINAR CRV/THRC: CPT | Performed by: PAIN MEDICINE

## 2019-12-13 ASSESSMENT — PAIN SCALES - GENERAL: PAINLEVEL: MODERATE PAIN (5)

## 2019-12-13 NOTE — NURSING NOTE
20 gauge Peripheral IV inserted into left anticubital - attempts: 2    Meme Titus, RN-BSN  [facility]

## 2019-12-13 NOTE — PATIENT INSTRUCTIONS
[facility]   Procedure Discharge Instructions    Today you saw:  Dr. Saw Howe     You had an:  Epidural steroid injection   -cervical    Medications used:  Lidocaine   Bupivacaine   Dexamethasone   Normal saline          Be cautious. Numbness and/or weakness in upper extremities may occur for up to 6-8 hours after the procedure due to effect of the local anesthetic    Do not drive for 6 hours. The effect of the local anesthetic could slow your reflexes.     You may resume your regular activities after 24 hours    Avoid strenuous activity for the first 24 hours    You may shower, however avoid swimming, tub baths or hot tubs for 24 hours following your procedure    You may have a mild to moderate increase in pain for several days following the injection.    It may take up to 14 days for the steroid medication to start working although you may feel the effect as early as a few days after the procedure.       You may use ice packs for 10-15 minutes, 3 to 4 times a day at the injection site for comfort    Do not use heat to painful areas for 6 to 8 hours. This will give the local anesthetic time to wear off and prevent you from accidentally burning your skin.     Unless you have been directed to avoid the use of anti-inflammatory medications (NSAIDS), you may use medications such as ibuprofen, Aleve or Tylenol for pain control if needed.     If you were fasting, you may resume your normal diet and medications after the procedure    If you have diabetes, check your blood sugar more frequently than usual as your blood sugar may be higher than normal for 10-14 days following a steroid injection. Contact your doctor who manages your diabetes if your blood sugar is higher than usual    Possible side effects of steroids that you may experience include flushing, elevated blood pressure, increased appetite, mild headaches and restlessness.  All of these symptoms will get better with  time.    If you experience any of the following, call the Pain Clinic during work hours (Mon-Friday 8-4:30 pm) at [phone] or the Provider Line after hours at [phone]:  -Fever over 100 degree F  -Swelling, bleeding, redness, drainage, warmth at the injection site  -Progressive weakness or numbness in your legs or arms  -Loss of bowel or bladder function  -Unusual headache that is not relieved by Tylenol or other pain reliever  -Unusual new onset of pain that is not improving

## 2019-12-13 NOTE — NURSING NOTE
Pre-procedure Intake    Have you been fasting? No     If yes, for how long? No     Are you taking a prescribed blood thinner such as coumadin, Plavix, Xarelto?    No    If yes, when did you take your last dose? No     Do you take aspirin?  No    If cervical procedure, have you held aspirin for 6 days?   No     Do you have any allergies to contrast dye, iodine, steroid and/or numbing medications?  NO    Are you currently taking antibiotics or have an active infection?  NO    Have you had a fever/elevated temperature within the past week? NO    Are you currently taking oral steroids? NO    Do you have a ? Yes       Are you pregnant or breastfeeding?  Not Applicable    Are the vital signs normal?  Yes    Jumana Smith MA

## 2019-12-13 NOTE — PROGRESS NOTES
[facility] - Procedure Note    Date of Visit: 12/13/2019    Procedure performed: C7-T1 interlaminar epidural steroid injection with fluoroscopic guidance  Diagnosis: Cervical spondylosis; Cervical radiculitis/radiculopathy  : Saw Howe MD  Anesthesia: none    Indications: Tip Hernandez Jr. is a 59 year old male who is seen  for cervical epidural steroid injection. The patient describes bilateral neck pain. The patient has been exhibiting symptoms consistent with cervical intraspinal inflammation and radiculopathy. Symptoms have been persistent, disabling, and intermittently severe. The patient reports minimal improvement with conservative treatment, including meds/PT/prior procedures.    Cervical MRI reviewed in PACS and report in care everywhere   C2-C3: Normal disc height. No herniation. No facet arthropathy. No spinal canal  stenosis. No right neural foraminal stenosis. No left neural foraminal stenosis.    C3-C4: Diminished T2 signal and mild loss of height. Slight posterior annular  bulge. Left-sided facet arthropathy. No spinal canal stenosis. No right neural  foraminal stenosis. No left neural foraminal stenosis.    C4-C5: Diminished T2 signal and mild loss of height. Posterior annular bulge.  Mild facet arthropathy. No spinal canal stenosis. Moderate right neural  foraminal stenosis. Mild left neural foraminal stenosis.      C5-C6: Posterior annular bulge with mild loss of height. Minimal facet  arthropathy. Mild spinal canal stenosis. Severe right neural foraminal stenosis.  Severe left neural foraminal stenosis.    C6-C7: Advanced loss of T2 signal and loss of height. Circumferential annular  bulge and osteophytic endplate and uncinate spurring. No facet arthropathy.  Effacement of the ventral aspect of the thecal sac without overall spinal canal  stenosis. Mild right neural foraminal stenosis. Moderate to severe left neural  foraminal stenosis.    C7-T1: Normal  disc height. No herniation. No facet arthropathy. No spinal canal  stenosis. No right neural foraminal stenosis. No left neural foraminal stenosis.    IMPRESSION:  1.  No cord signal abnormality.  2.  Moderate right and mild left neural foraminal stenosis C4-C5.  3.  Mild spinal canal stenosis C5-C6 with severe bilateral foraminal stenosis.  4.  Effacement of the ventral aspect of the thecal sac C6-C7 with mild right and  moderate to severe left neural foraminal stenosis.  5.  No additional neural impingement.  allergies:    No Known Allergies     Vitals:  /89   Pulse 86     Review of Systems: The patient denies recent fever, chills, illness, use of antibiotics or anticoagulants. All other 10-point review of systems negative.     Procedure: The procedure and risks were explained, and informed written consent was obtained from the patient. Risks include but are not limited to: infection, bleeding, increased pain, and damage to soft tissue, nerve, muscle, and vasculature structures. After getting informed consent, patient was brought into the procedure suite and was placed in a prone position on the procedure table. A Pause for the Cause was performed. Patient was prepped and draped in sterile fashion.     The C7-T1 interspace was identified with use of fluoroscopy in AP view. A 25-gauge, 1.5 inch needle was used to anesthetize the skin and subcutaneous tissue entry site with a total of 2 ml of 1% lidocaine. Under fluoroscopic visualization, a 22-gauge, 3.5 inch Tuohy epidural needle was slowly advanced towards the epidural space a few millimeters bilateral of midline. The latter part of the needle advancement was guided with fluoroscopy in the lateral view. The epidural space was identified using loss of resistance technique. After negative aspiration for heme and cerebrospinal fluid, a total of 1 mL of Omnipaque was injected to confirm needle placement. 9 mL of contrast was wasted. Epidurogram confirmed  spread within the posterior epidural space. 2 ml of 10mg/ml of dexamethasone and 1 ml of preservative free 0.25% bupivacaine was injected. The needle was removed.  Images were saved to PACS.    The patient tolerated the procedure well, and there was no evidence of procedural complications. No new sensory or motor deficits were noted following the procedure. The patient was stable and able to ambulate on discharge home. Post-procedure instructions were provided.     Pre-procedure pain score: 6/10 in the neck, 6/10 in the arm  Post-procedure pain score: 4/10 in the neck, 4/10 in the arm    Assessment/Plan: Tip Hernandez Jr. is a 59 year old male s/p cervical interlaminar epidural steroid injection today for cervical spondylosis and radiculitis/radiculopathy.     1. Following today's procedure, the patient was advised to contact the [facility] for any of the following:   Fever, chills, or night sweats   New onset of pain, numbness, or weakness   Any questions/concerns regarding the procedure  If unable to contact the Pain Center, the patient was instructed to go to a local Emergency Room for any complications.   2. The patient will receive a follow-up call in 1 week.   3. Follow-up with referring provider in 2 weeks for post-procedure evaluation.    [facility]

## 2019-12-21 ENCOUNTER — TELEPHONE (OUTPATIENT)
Dept: PALLIATIVE MEDICINE | Facility: CLINIC | Age: 59
End: 2019-12-21

## 2019-12-21 NOTE — TELEPHONE ENCOUNTER
Patient had a C7-T1 interlaminar epidural steroid  injection on 12/13/19.  Called patient for an update.      Left message that we were calling for an update about how s/he was doing after the injection.  LM that if s/he has any problems or questions to call the clinic at [phone].

## 2020-01-03 ENCOUNTER — TELEPHONE (OUTPATIENT)
Dept: FAMILY MEDICINE | Facility: CLINIC | Age: 60
End: 2020-01-03

## 2020-01-03 NOTE — TELEPHONE ENCOUNTER
"RN called [facility] to get more information.  Patient has a person # so active in their system.  Verbal pain mgmnt referral was given over the phone.  [facility] states they will contact patient for more questions and then his \"case\" will be sent to the MD review team who will then decide if they want to take his case on.  Patient is aware of this.  Tesha GUILLAUME RN    "

## 2020-01-03 NOTE — TELEPHONE ENCOUNTER
Reason for Call:  Referral to the [facility]    Detailed comments: Pt has called and requested a referral to [facility].  He gave a number of [phone].  Stating that the epi durel that was done last did not work for hi C7-T1 pain.  Did not know exactly where at [place] he was going just gave this number.      Phone Number Patient can be reached at: Home number on file [phone] (home)    Best Time: any    Can we leave a detailed message on this number? YES    Call taken on 1/3/2020 at 8:30 AM by Georgia Silva

## 2020-01-13 ENCOUNTER — OFFICE VISIT (OUTPATIENT)
Dept: FAMILY MEDICINE | Facility: CLINIC | Age: 60
End: 2020-01-13
Payer: COMMERCIAL

## 2020-01-13 VITALS
OXYGEN SATURATION: 97 % | BODY MASS INDEX: 38.22 KG/M2 | SYSTOLIC BLOOD PRESSURE: 138 MMHG | TEMPERATURE: 97 F | DIASTOLIC BLOOD PRESSURE: 94 MMHG | RESPIRATION RATE: 20 BRPM | HEIGHT: 70 IN | WEIGHT: 267 LBS | HEART RATE: 78 BPM

## 2020-01-13 DIAGNOSIS — R53.83 OTHER FATIGUE: Primary | ICD-10-CM

## 2020-01-13 DIAGNOSIS — R06.00 DYSPNEA, UNSPECIFIED TYPE: ICD-10-CM

## 2020-01-13 PROCEDURE — 99214 OFFICE O/P EST MOD 30 MIN: CPT | Performed by: FAMILY MEDICINE

## 2020-01-13 ASSESSMENT — MIFFLIN-ST. JEOR: SCORE: 2022.35

## 2020-01-13 NOTE — PROGRESS NOTES
Subjective     Tip Hernandez Jr. is a 59 year old male who presents to clinic today for the following health issues:    HPI   FATIGUE       Duration: Since July 2019.    Description (location/character/radiation): States he has been feeling fatigue since dealing with his neck and left shoulder pain.  The fatigue has been getting worse.    Intensity:  mild, severe-will vary.    Accompanying signs and symptoms: Has noticed some shortness of breath.  Had an injection for the shoulder pain.  That didn't help.  He has an appointment scheduled at [place] on 1-27-20.  He wanted to discuss about the fatigue prior to going there.     History (similar episodes/previous evaluation): Clinic visit on 7-20-19 for the shoulder and neck pain.    [facility] discuss about the fatigue and lab levels were checked.  These levels are listed in Epic to view.    Precipitating or alleviating factors: None, his symptoms are just there.  He is able to ride his bike and will feel fine.  He just has to push himself to do this.    Therapies tried and outcome: Cortisone injection for the shoulder, didn't help.         Current Outpatient Medications:      lisinopril (PRINIVIL/ZESTRIL) 20 MG tablet, Take 1 tablet (20 mg) by mouth daily, Disp: 90 tablet, Rfl: 3     MULTI-DAY VITAMINS OR TABS, 1 daily, Disp: , Rfl:      omeprazole (PRILOSEC) 40 MG DR capsule, TAKE 1 CAPSULE BY MOUTH ONCE DAILY 30-60 MINUTES BEFORE A MEAL, Disp: 90 capsule, Rfl: 3     TYLENOL 500 MG OR TABS, 2 tabs [id number], Disp: , Rfl:       Patient Active Problem List   Diagnosis     Essential hypertension, benign     Obesity     Hyperlipidemia with target LDL less than 130     RA (rheumatoid arthritis) (H)     Osteoarthritis     Eczema     DJD (degenerative joint disease), lumbar     DJD (degenerative joint disease) of cervical spine     GERD (gastroesophageal reflux disease)     Primary osteoarthritis of right ankle     Primary osteoarthritis  "of left knee     Blood pressure (!) 138/94, pulse 78, temperature 97  F (36.1  C), temperature source Tympanic, resp. rate 20, height 1.778 m (5' 10\"), weight 121.1 kg (267 lb), SpO2 97 %.    Exam:  EYES: EOMI,  PERRL  NECK: no adenopathy, no asymmetry, masses, or scars and thyroid normal to palpation  RESP: lungs clear to auscultation - no rales, rhonchi or wheezes  CV: regular rates and rhythm, normal S1 S2, no S3 or S4 and no murmur, click or rub -  PSYCH: mentation appears normal and affect normal/bright      (R53.83) Other fatigue  (primary encounter diagnosis)  Comment:   Plan: Echocardiogram Exercise Stress        We discussed some of the possible causes of fatigue, and reviewed the labs done in September at the outside clinic.   These did not show any  Cause of the fatigue. Call [phone] for Cardiology, or just go there, to schedule the echo stress test.   Avoid heavy exertion until this test. Take one 325 mg Asprin daily. Stop this if the heart tests are normal. See below. We will notify of the results  And recommendations.     (R06.00) Dyspnea, unspecified type  Comment:   Plan: Echocardiogram Exercise Stress, General PFT Lab        (Please always keep checked), Pulmonary         Function Test        See above. Make the appt for the lung test at [phone] to do after the heart test, and do this. We will call the results.   If they are both normal then recheck in clinic.       Danial Fitzpatrick MD        "

## 2020-01-21 ENCOUNTER — HOSPITAL ENCOUNTER (OUTPATIENT)
Dept: CARDIOLOGY | Facility: CLINIC | Age: 60
Discharge: HOME | End: 2020-01-21
Attending: FAMILY MEDICINE | Admitting: FAMILY MEDICINE
Payer: COMMERCIAL

## 2020-01-21 DIAGNOSIS — R06.00 DYSPNEA, UNSPECIFIED TYPE: ICD-10-CM

## 2020-01-21 DIAGNOSIS — R53.83 OTHER FATIGUE: ICD-10-CM

## 2020-01-21 PROCEDURE — 93325 DOPPLER ECHO COLOR FLOW MAPG: CPT | Mod: TC

## 2020-01-21 PROCEDURE — 93325 DOPPLER ECHO COLOR FLOW MAPG: CPT | Mod: 26 | Performed by: INTERNAL MEDICINE

## 2020-01-21 PROCEDURE — 93321 DOPPLER ECHO F-UP/LMTD STD: CPT | Mod: 26 | Performed by: INTERNAL MEDICINE

## 2020-01-21 PROCEDURE — 93350 STRESS TTE ONLY: CPT | Mod: 26 | Performed by: INTERNAL MEDICINE

## 2020-01-21 PROCEDURE — 93018 CV STRESS TEST I&R ONLY: CPT | Performed by: INTERNAL MEDICINE

## 2020-01-21 PROCEDURE — 25500064 ZZH RX 255 OP 636: Performed by: INTERNAL MEDICINE

## 2020-01-21 PROCEDURE — 93016 CV STRESS TEST SUPVJ ONLY: CPT | Performed by: INTERNAL MEDICINE

## 2020-01-21 RX ADMIN — HUMAN ALBUMIN MICROSPHERES AND PERFLUTREN 2 ML: 10; .22 INJECTION, SOLUTION INTRAVENOUS at 08:07

## 2020-01-22 ENCOUNTER — HOSPITAL ENCOUNTER (OUTPATIENT)
Dept: RESPIRATORY THERAPY | Facility: CLINIC | Age: 60
Discharge: HOME | End: 2020-01-22
Attending: FAMILY MEDICINE | Admitting: FAMILY MEDICINE
Payer: COMMERCIAL

## 2020-01-22 DIAGNOSIS — R06.00 DYSPNEA, UNSPECIFIED TYPE: ICD-10-CM

## 2020-01-22 PROCEDURE — 94060 EVALUATION OF WHEEZING: CPT | Mod: 26 | Performed by: INTERNAL MEDICINE

## 2020-01-22 PROCEDURE — 94729 DIFFUSING CAPACITY: CPT | Mod: 26 | Performed by: INTERNAL MEDICINE

## 2020-01-22 PROCEDURE — 94729 DIFFUSING CAPACITY: CPT

## 2020-01-22 PROCEDURE — 94726 PLETHYSMOGRAPHY LUNG VOLUMES: CPT

## 2020-01-22 PROCEDURE — 94060 EVALUATION OF WHEEZING: CPT

## 2020-01-22 PROCEDURE — 25000125 ZZHC RX 250: Performed by: FAMILY MEDICINE

## 2020-01-22 PROCEDURE — 94726 PLETHYSMOGRAPHY LUNG VOLUMES: CPT | Mod: 26 | Performed by: INTERNAL MEDICINE

## 2020-01-22 RX ORDER — ALBUTEROL SULFATE 0.83 MG/ML
2.5 SOLUTION RESPIRATORY (INHALATION) ONCE
Status: COMPLETED | OUTPATIENT
Start: 2020-01-22 | End: 2020-01-22

## 2020-01-22 RX ADMIN — ALBUTEROL SULFATE 2.5 MG: 2.5 SOLUTION RESPIRATORY (INHALATION) at 16:20

## 2020-01-24 LAB
DLCOUNC-%PRED-PRE: 117 %
DLCOUNC-PRE: 32.34 ML/MIN/MMHG
DLCOUNC-PRED: 27.46 ML/MIN/MMHG
ERV-%PRED-PRE: 55 %
ERV-PRE: 0.36 L
ERV-PRED: 0.66 L
EXPTIME-PRE: 4.8 SEC
FEF2575-%PRED-POST: 164 %
FEF2575-%PRED-PRE: 142 %
FEF2575-POST: 4.83 L/SEC
FEF2575-PRE: 4.19 L/SEC
FEF2575-PRED: 2.93 L/SEC
FEFMAX-%PRED-PRE: 106 %
FEFMAX-PRE: 9.76 L/SEC
FEFMAX-PRED: 9.15 L/SEC
FEV1-%PRED-PRE: 100 %
FEV1-PRE: 3.55 L
FEV1FEV6-PRE: 85 %
FEV1FEV6-PRED: 79 %
FEV1FVC-PRE: 85 %
FEV1FVC-PRED: 77 %
FEV1SVC-PRE: 80 %
FEV1SVC-PRED: 71 %
FIFMAX-PRE: 4.78 L/SEC
FRCPLETH-%PRED-PRE: 97 %
FRCPLETH-PRE: 3.52 L
FRCPLETH-PRED: 3.62 L
FVC-%PRED-PRE: 90 %
FVC-PRE: 4.17 L
FVC-PRED: 4.6 L
IC-%PRED-PRE: 94 %
IC-PRE: 4.07 L
IC-PRED: 4.31 L
RVPLETH-%PRED-PRE: 129 %
RVPLETH-PRE: 3.15 L
RVPLETH-PRED: 2.44 L
TLCPLETH-%PRED-PRE: 106 %
TLCPLETH-PRE: 7.58 L
TLCPLETH-PRED: 7.13 L
VA-%PRED-PRE: 95 %
VA-PRE: 6.26 L
VC-%PRED-PRE: 89 %
VC-PRE: 4.43 L
VC-PRED: 4.97 L

## 2020-01-29 ENCOUNTER — TELEPHONE (OUTPATIENT)
Dept: PALLIATIVE MEDICINE | Facility: CLINIC | Age: 60
End: 2020-01-29

## 2020-01-29 DIAGNOSIS — M47.812 CERVICAL SPONDYLOSIS WITHOUT MYELOPATHY: Primary | ICD-10-CM

## 2020-01-29 NOTE — TELEPHONE ENCOUNTER
Pt called requesting repeat EDUARDO injection  but instead of the Nerve her is requesting it to be done in the Joints. He is questioning the name of the injection so he can request a referral from referring md. Verito BROOKS    [facility]

## 2020-01-30 NOTE — TELEPHONE ENCOUNTER
Injection only pt.  Dr. Howe saw pt on 12/2019 for a cervical epidural steroid injection.    Called pt.  The epidural steroid injection was not helpful at all.    Pain location:  Neck and bilateral shoulder pain. He says that Dr. Howe told him if the cervical epidural steroid injection was not helpful that a joint injection would be indicated.  Writer informed him that would have Dr. Howe review and then have his primary care provider order for him.    Meme Titus RN-BSN  [facility]

## 2020-01-31 NOTE — TELEPHONE ENCOUNTER
"Pre-screening Questions for Radiology Injections:    Injection to be done at which interventional clinic site? [facility]    Instruct patient to arrive as directed prior to the scheduled appointment time:    [place] minutes before      [place]: 30 minutes before; if IV needed 1 hour before     Dr. Ball-no IV needed for Cervical GUSTAVO; please instruct to arrive 30\" early    Procedure ordered by Dr. Howe    Procedure ordered? bilateral cervical facet joint injection      Transforaminal Cervical GUSTAVO - no pain provider currently performing    What insurance would patient like us to bill for this procedure? [employer]      Worker's comp or MVA (motor vehicle accident) -Any injection DO NOT SCHEDULE and route to Kat Royce.      [employer] insurance - For SI joint injections, DO NOT SCHEDULE and route Kat Crane.       [employer] - Any injection besides hip/shoulder/knee joint DO NOT SCHEDULE and route to Kat Royce. She will obtain PA and call pt back to schedule procedure or notify pt of denial.       [employer]-Route to Kat for review      **BCBS- ALL need to be routed to Kat for review if a PA is needed**      IF SCHEDULING IN [place] AND NEEDS A PA, IT IS OKAY TO SCHEDULE. [place] HANDLES THEIR OWN PA'S AFTER THE PATIENT IS SCHEDULED. PLEASE SCHEDULE AT LEAST 1 WEEK OUT SO A PA CAN BE OBTAINED.    Any chance of pregnancy? Not Applicable   If YES, do NOT schedule and route to RN pool    Is an  needed? No     Patient has a drive home? (mandatory) YES: Informed    Is patient taking any blood thinners (i.e. plavix, coumadin, jantoven, warfarin, heparin, pradaxa or dabigatran, etc)? No   If hold needed, do NOT schedule, route to RN pool     Is patient taking any aspirin products (includes Excedrin and Fiorinal)? Yes - Pt takes 325 mg daily; instructed to hold 6 day(s) prior to procedure.      If more than 325mg/day do NOT schedule; route to RN pool     For " CERVICAL procedures, hold all aspirin products for 6 days.     Tell pt that if aspirin product is not held for 6 days, the procedure WILL BE cancelled.      Does the patient have a bleeding or clotting disorder? No     If YES, okay to schedule AND route to RN nurse pool    For any patients with platelet count <100, must be forwarded to provider    Is patient diabetic?  No  If YES, instruct them to bring their glucometer.    Does patient have an active infection or treated for one within the past week? No     Is patient currently taking any antibiotics?  No     For patients on chronic, preventative, or prophylactic antibiotics, procedures may be scheduled.     For patients on antibiotics for active or recent infection:antibiotic course must have been completed for 4 days    Is patient currently taking any steroid medications? (i.e. Prednisone, Medrol)  No     For patients on steroid medications, course must have been completed for 4 days    Reviewed with patient:  If you are started on any steroids or antibiotics between now and your appointment, you must contact us because the procedure may need to be cancelled.  Yes    Is patient actively being treated for cancer or immunocompromised? No  If YES, do NOT schedule and route to RN pool     Are you able to get on and off an exam table with minimal or no assistance? Yes  If NO, do NOT schedule and route to RN pool    Are you able to roll over and lay on your stomach with minimal or no assistance? Yes  If NO, do NOT schedule and route to RN pool     Any allergies to contrast dye, iodine, shellfish, or numbing and steroid medications? No  If YES, route to RN pool AND add allergy information to appointment notes    Allergies: Patient has no known allergies.      Has the patient had a flu shot or any other vaccinations within 7 days before or after the procedure.  No     Does patient have an MRI/CT?  YES: 2019  Check Procedure Scheduling Grid to see if required.      Was  the MRI done within the last 3 years?  Yes    If yes, where was the MRI done i.e.[facility], [facility], [place], [facility] etc? [place], pt states Dr. Howe could access it at [facility]    If no, do not schedule and route to RN pool    If MRI was not done at [place], [facility] or [facility] do NOT schedule and route to RN pool.      If pt has an imaging disc, the injection MAY be scheduled but pt has to bring disc to appt.     If they show up without the disc the injection cannot be done    Reminders (please tell patient if applicable):       Instructed pt to arrive 30 minutes early for IV start if required. (Check Procedure Scheduling Grid)  Not Applicable      If celiac plexus block, informed patient NPO for 6 hours and that it is okay to take medications with sips of water, especially blood pressure medications  Not Applicable         If this is for a cervical procedure, informed patient that aspirin needs to be held for 6 days.   YES: Informed      For all patients not having spinal cord stimulator (SCS) trials or radiofrequency ablations (RFAs), informed patient:    IV sedation is not provided for this procedure.  If you feel that an oral anti-anxiety medication is needed, you can discuss this further with your referring provider or primary care provider.  The Pain Clinic provider will discuss specifics of what the procedure includes at your appointment.  Most procedures last 10-20 minutes.  We use numbing medications to help with any discomfort during the procedure.  Not Applicable      Do not schedule procedures requiring IV placement in the first appointment of the day or first appointment after lunch. Do NOT schedule at 0745, 0815 or 1245.       For patients 85 or older we recommend having an adult stay w/ them for the remainder of the day.       Does the patient have any questions?  YES: Pt wants to confirm this correct injection  Meghan Mercedes  [facility]

## 2020-02-05 NOTE — TELEPHONE ENCOUNTER
Per the 11/21/2019 telephone encounter:     MRI images have been pushed into our system.  Please add this to the appointment notes.  Routed to scheduling coordinators to call pt to schedule a cervical epidural steroid injection.        Called pt and discuss the details of the injection in full.    Meme Titus, RN-BSN  [facility]

## 2020-02-11 ENCOUNTER — ANCILLARY PROCEDURE (OUTPATIENT)
Dept: GENERAL RADIOLOGY | Facility: CLINIC | Age: 60
End: 2020-02-11
Attending: FAMILY MEDICINE
Payer: COMMERCIAL

## 2020-02-11 ENCOUNTER — OFFICE VISIT (OUTPATIENT)
Dept: FAMILY MEDICINE | Facility: CLINIC | Age: 60
End: 2020-02-11
Payer: COMMERCIAL

## 2020-02-11 ENCOUNTER — TELEPHONE (OUTPATIENT)
Dept: FAMILY MEDICINE | Facility: CLINIC | Age: 60
End: 2020-02-11

## 2020-02-11 VITALS
HEIGHT: 70 IN | BODY MASS INDEX: 37.51 KG/M2 | TEMPERATURE: 97.6 F | WEIGHT: 262 LBS | SYSTOLIC BLOOD PRESSURE: 106 MMHG | OXYGEN SATURATION: 97 % | DIASTOLIC BLOOD PRESSURE: 78 MMHG | HEART RATE: 95 BPM | RESPIRATION RATE: 16 BRPM

## 2020-02-11 DIAGNOSIS — M25.551 ACUTE RIGHT HIP PAIN: Primary | ICD-10-CM

## 2020-02-11 DIAGNOSIS — Z96.641 STATUS POST TOTAL REPLACEMENT OF RIGHT HIP: ICD-10-CM

## 2020-02-11 DIAGNOSIS — M79.651 PAIN OF RIGHT THIGH: ICD-10-CM

## 2020-02-11 PROCEDURE — 73502 X-RAY EXAM HIP UNI 2-3 VIEWS: CPT

## 2020-02-11 PROCEDURE — 99214 OFFICE O/P EST MOD 30 MIN: CPT | Performed by: FAMILY MEDICINE

## 2020-02-11 ASSESSMENT — MIFFLIN-ST. JEOR: SCORE: 1999.67

## 2020-02-11 NOTE — TELEPHONE ENCOUNTER
Patient is called and told that Dr. Wood does not do hip cortisone injections.  Then patient states he wants a hip xray.  We can do that also just a plain xray.  We do not do CT or MRI in clinic. [place] VARGHESE RN

## 2020-02-11 NOTE — PROGRESS NOTES
Subjective     Tip Hernandez Jr. is a 59 year old male who presents to clinic today for the following health issues:  Chief Complaint   Patient presents with     Musculoskeletal Problem     Pt here for right hip pain.     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  Patient denies recent trauma.  Patient said he was just getting out of bed at onset when the right hip and thigh became slightly painful, and just progressed from there.  Patient points the most pain to right anterior thigh that often wraps around right hip and sometimes involves the right lower back.    Patient Active Problem List   Diagnosis     Essential hypertension, benign     Obesity     Hyperlipidemia with target LDL less than 130     RA (rheumatoid arthritis) (H)     Osteoarthritis     Eczema     DJD (degenerative joint disease), lumbar     DJD (degenerative joint disease) of cervical spine     GERD (gastroesophageal reflux disease)     Primary osteoarthritis of right ankle     Primary osteoarthritis of left knee     Past Surgical History:   Procedure Laterality Date     INJECT EPIDURAL LUMBAR  1/12/2011    INJECT EPIDURAL LUMBAR performed by GENERIC ANESTHESIA PROVIDER at [place] [facility]     INJECT EPIDURAL LUMBAR  4/11/2011    Procedure:INJECT EPIDURAL LUMBAR; GUSTAVO Fitzpatrick; Surgeon:GENERIC ANESTHESIA PROVIDER; Location:[place] [facility]     SURGICAL HISTORY OF -    "    left knee meniscus repair       Social History     Tobacco Use     Smoking status: Former Smoker     Last attempt to quit: 1992     Years since quittin.1     Smokeless tobacco: Never Used   Substance Use Topics     Alcohol use: Yes     Comment: OCC     Family History   Problem Relation Age of Onset     Hypertension Mother      Heart Disease Mother      Lipids Mother      Diabetes Mother      Hypertension Father      Heart Disease Father      Diabetes Maternal Grandfather      Hypertension Paternal Grandmother      Heart Disease Paternal Grandmother      Hypertension Brother      Allergies Daughter      Allergies Son          Current Outpatient Medications   Medication Sig Dispense Refill     lisinopril (PRINIVIL/ZESTRIL) 20 MG tablet Take 1 tablet (20 mg) by mouth daily 90 tablet 3     MULTI-DAY VITAMINS OR TABS 1 daily       omeprazole (PRILOSEC) 40 MG DR capsule TAKE 1 CAPSULE BY MOUTH ONCE DAILY 30-60 MINUTES BEFORE A MEAL 90 capsule 3     TYLENOL 500 MG OR TABS 2 tabs [id number]       No Known Allergies    Reviewed and updated as needed this visit by Provider  Tobacco  Allergies  Meds  Problems  Med Hx  Surg Hx  Fam Hx         Review of Systems   C: NEGATIVE for fever, chills, change in weight  I: NEGATIVE for worrisome rashes, moles or lesions  MUSCULOSKELETAL:see above  N: NEGATIVE for weakness, dizziness or paresthesias  H: NEGATIVE for bleeding problems      Objective    /78   Pulse 95   Temp 97.6  F (36.4  C) (Tympanic)   Resp 16   Ht 1.778 m (5' 10\")   Wt 118.8 kg (262 lb)   SpO2 97%   BMI 37.59 kg/m    Body mass index is 37.59 kg/m .  Physical Exam   GENERAL:  alert and no distress  RIGHT Hip Exam:   INSPECTION: no swelling/deformity; no discoloration  Tender:   none  Range of Motion:  Right Hip  flexion   full, with pain on quads; extension  full, pain on quads; external rotation  full, pain-free; internal rotation  full, pain-free; adduction  full, painful on quads; " abduction  full, pain-free  Strength:  full strength  Special tests:  no crepitation/snapping over central inguinal region, no crepitation/snapping over greater trochanter, no IT band tightness, NUBIA negatvie, FADER negative    SKIN: no suspicious lesions, no rashes  Diagnostic Test Results:  none         Assessment & Plan     Tip was seen today for musculoskeletal problem and flu shot.    Diagnoses and all orders for this visit:    Acute right hip pain  -     XR Pelvis and Hip Right 1 View  -     Orthopedic & Spine  Referral; Future    Pain of right thigh  -     XR Pelvis and Hip Right 1 View  -     Orthopedic & Spine  Referral; Future    Status post total replacement of right hip         Other than quadriceps area pain, no significant dysfunction on exam.  Unclear etiology of hi sright thigh and hip pain.  Strain is a consideration. Unlikely to have a fracture as there is no trauma. Displaced hardware? Radiculopathy?  Xray ordered to evaluate right hip hardware structure and surrounding bony structures.  Referred to ortho for further evaluation and possible consideration for hip injection if appropriate.  Consider MRI of hip and/or lumbar spine if soft tissue pathology or radiculopathy is suspected after further eval by ortho.  Acetaminophen 500 mg orally 1-2 tabs every 4-6 hrs as needed for pain  Return precautions discussed and given to patient.      Patient Instructions   You will be contacted in 1-2 days for results of your xray.    You will be contacted in 1-2 business days to get a schedule for the non-surgical orthopedic specialist.    Acetaminophen 500 mg orally 1-2 tabs every 4-6 hrs as needed for pain.        Patient Education     Arthralgia    Arthralgia is the term for pain in or around the joint. It is a symptom, not a disease. This pain may involve one or more joints. In some cases, the pain moves from joint to joint.  There are many causes for joint pain. These  include:    Injury    Osteoarthritis (wearing out of the joint surface)    Gout (inflammation of the joint due to crystals in the joint fluid)    Infection inside the joint      Bursitis (inflammation of the fluid-filled sacs around the joint)    Autoimmune disorders such as rheumatoid arthritis or lupus    Tendonitis (inflammation of chords that attach muscle to bone)  Home care    Rest the involved joint(s) until your symptoms improve.     You may be prescribed pain medicine. If none is prescribed, you may use acetaminophen or ibuprofen to control pain and inflammation.    Follow-up care  Follow up with your healthcare provider or as advised.  When to seek medical advice  Contact your healthcare provider right away if any of the following occurs:    Pain, swelling, or redness of joint increases    Pain worsens or recurs after a period of improvement    Pain moves to other joints    You cannot bear weight on the affected joint     You cannot move the affected joint    Joint appears deformed    New rash appears    Fever of 100.4 F (38 C) or higher, or as directed by your healthcare provider  Date Last Reviewed: 3/1/2017    0108-6214 The [employer]. [address] [place] [address]. All rights reserved. This information is not intended as a substitute for professional medical care. Always follow your healthcare professional's instructions.               Return in about 1 week (around 2/18/2020) for ortho  consult.    Cayetano Wood MD  [facility]

## 2020-02-11 NOTE — TELEPHONE ENCOUNTER
Reason for call:  Patient reporting a symptom    Symptom or request: Patient is asking if Dr. Wood would do a Cortizone shot today at his appointment at 1120 am he has had one before but he said his pain is almost unbearable.     Have you been treated for this before? Yes    Phone Number patient can be reached at:  Cell number on file:    Telephone Information:   Mobile [phone]       Best Time:  any    Can we leave a detailed message on this number:  YES    Call taken on 2/11/2020 at 7:44 AM by Kimberlee Rodriguez

## 2020-02-11 NOTE — PATIENT INSTRUCTIONS
You will be contacted in 1-2 days for results of your xray.    You will be contacted in 1-2 business days to get a schedule for the non-surgical orthopedic specialist.    Acetaminophen 500 mg orally 1-2 tabs every 4-6 hrs as needed for pain.        Patient Education     Arthralgia    Arthralgia is the term for pain in or around the joint. It is a symptom, not a disease. This pain may involve one or more joints. In some cases, the pain moves from joint to joint.  There are many causes for joint pain. These include:    Injury    Osteoarthritis (wearing out of the joint surface)    Gout (inflammation of the joint due to crystals in the joint fluid)    Infection inside the joint      Bursitis (inflammation of the fluid-filled sacs around the joint)    Autoimmune disorders such as rheumatoid arthritis or lupus    Tendonitis (inflammation of chords that attach muscle to bone)  Home care    Rest the involved joint(s) until your symptoms improve.     You may be prescribed pain medicine. If none is prescribed, you may use acetaminophen or ibuprofen to control pain and inflammation.    Follow-up care  Follow up with your healthcare provider or as advised.  When to seek medical advice  Contact your healthcare provider right away if any of the following occurs:    Pain, swelling, or redness of joint increases    Pain worsens or recurs after a period of improvement    Pain moves to other joints    You cannot bear weight on the affected joint     You cannot move the affected joint    Joint appears deformed    New rash appears    Fever of 100.4 F (38 C) or higher, or as directed by your healthcare provider  Date Last Reviewed: 3/1/2017    4620-5679 [employer]. [address] [place] [address], [place] [address]. All rights reserved. This information is not intended as a substitute for professional medical care. Always follow your healthcare professional's instructions.

## 2020-02-12 ENCOUNTER — OFFICE VISIT (OUTPATIENT)
Dept: FAMILY MEDICINE | Facility: CLINIC | Age: 60
End: 2020-02-12
Payer: COMMERCIAL

## 2020-02-12 VITALS
TEMPERATURE: 98 F | WEIGHT: 262 LBS | HEIGHT: 70 IN | HEART RATE: 82 BPM | OXYGEN SATURATION: 98 % | BODY MASS INDEX: 37.51 KG/M2 | DIASTOLIC BLOOD PRESSURE: 82 MMHG | RESPIRATION RATE: 16 BRPM | SYSTOLIC BLOOD PRESSURE: 122 MMHG

## 2020-02-12 DIAGNOSIS — M51.16 LUMBAR DISC HERNIATION WITH RADICULOPATHY: Primary | ICD-10-CM

## 2020-02-12 PROCEDURE — 99214 OFFICE O/P EST MOD 30 MIN: CPT | Performed by: FAMILY MEDICINE

## 2020-02-12 RX ORDER — IBUPROFEN 200 MG
600 TABLET ORAL EVERY 4 HOURS PRN
COMMUNITY
End: 2021-02-25

## 2020-02-12 RX ORDER — KETOROLAC TROMETHAMINE 10 MG/1
10 TABLET, FILM COATED ORAL EVERY 6 HOURS PRN
Qty: 20 TABLET | Refills: 0 | Status: SHIPPED | OUTPATIENT
Start: 2020-02-12 | End: 2020-07-02

## 2020-02-12 ASSESSMENT — MIFFLIN-ST. JEOR: SCORE: 1999.67

## 2020-02-12 ASSESSMENT — PAIN SCALES - GENERAL: PAINLEVEL: WORST PAIN (10)

## 2020-02-12 NOTE — PATIENT INSTRUCTIONS
Thank you for choosing [facility].  You may be receiving an email and/or telephone survey request from [facility] Customer Experience regarding your visit today.  Please take a few minutes to respond to the survey to let us know how we are doing.      If you have questions or concerns, please contact us via [employer] or you can contact your care team at [phone].    Our Clinic hours are:  Monday 6:40 am  to 7:00 pm  Tuesday -Friday 6:40 am to 5:00 pm    The [place] outpatient lab hours are:  Monday - Friday 6:10 am to 4:45 pm  Saturdays 7:00 am to 11:00 am  Appointments are required, call [phone]    If you have clinical questions after hours or would like to schedule an appointment,  call the clinic at [phone].    (M51.16) Lumbar disc herniation with radiculopathy  (primary encounter diagnosis)  Comment:   Plan: PHYSICAL THERAPY REFERRAL        We discussed the likely cause as pressure on the right L4-5 nerve from a herniated disc. Modify activities and avoid  Repetitive bending and twisting and lifting. Physical therapy with modalities and traction is ordered. If not better quickly then consideration for   An MRI of the lumbar spine without contrast would be done. Call to schedule at [phone] for this, as I ordered it. Toradol at 10 mg with food every 6 hours as needed.   There is only 20 pills.

## 2020-02-12 NOTE — PROGRESS NOTES
Subjective     Tip Hernandez Jr. is a 59 year old male who presents to clinic today for the following health issues:    Chief Complaint   Patient presents with     Musculoskeletal Problem     Right Hip/Low Back, Thigh,Knee pain - was seen by Dr. Wood yesterday for this, x ray done of Right hip. Patient would like Dr. Fitzpatrick to view x ray as well, and he would like cortisone injections.     See note below copy and pasted from yesterday's visit with Dr. Wood    HPI   Joint Pain ( from 2/11/2020 visit with Dr. Wood)     Onset: 2/2/20    Description:   Location: right hip  Character: Sharp and Stabbing    Intensity: 10/10    Progression of Symptoms: same    Accompanying Signs & Symptoms:  Other symptoms: radiation of pain to thigh and lower back    History:   Previous similar pain: has had back pain in the past      Precipitating factors:   Trauma or overuse: no, right hip replacement 7-8 yrs ago     Alleviating factors:  Improved by: chiropractor-little relief     Therapies Tried and outcome: tylenol, ibuprofen-do not help    X ray done 2/11/2020  PELVIS AND RIGHT HIP ONE VIEW 2/11/2020 11:59 AM      HISTORY: Unexplained right hip and thigh pain; atraumatic. History of  right total hip arthroplasty.    COMPARISON: 7/20/2010.     IMPRESSION: Interval placement of right total hip arthroplasty. The  distal tip of the femoral stem is not included in the field-of-view.  No acute bony abnormality. Left hip is unremarkable.     ANAND DONOVAN MD      Current Outpatient Medications:      ibuprofen (ADVIL/MOTRIN) 200 MG tablet, Take 600 mg by mouth every 4 hours as needed for mild pain, Disp: , Rfl:      lisinopril (PRINIVIL/ZESTRIL) 20 MG tablet, Take 1 tablet (20 mg) by mouth daily, Disp: 90 tablet, Rfl: 3     MULTI-DAY VITAMINS OR TABS, 1 daily, Disp: , Rfl:      omeprazole (PRILOSEC) 40 MG DR capsule, TAKE 1 CAPSULE BY MOUTH ONCE DAILY 30-60 MINUTES BEFORE A MEAL, Disp: 90 capsule, Rfl: 3     TYLENOL 500 MG OR  "TABS, 2 tabs [id number], Disp: , Rfl:   \  Patient Active Problem List   Diagnosis     Essential hypertension, benign     Obesity     Hyperlipidemia with target LDL less than 130     RA (rheumatoid arthritis) (H)     Osteoarthritis     Eczema     DJD (degenerative joint disease), lumbar     DJD (degenerative joint disease) of cervical spine     GERD (gastroesophageal reflux disease)     Primary osteoarthritis of right ankle     Primary osteoarthritis of left knee       Blood pressure 122/82, pulse 82, temperature 98  F (36.7  C), temperature source Tympanic, resp. rate 16, height 1.778 m (5' 10\"), weight 118.8 kg (262 lb), SpO2 98 %.    Exam:  GENERAL APPEARANCE: healthy, alert and no distress  MS: decreased range of motion of the lumbar area. There is tenderness in the right sciatic notch.   SKIN: no suspicious lesions or rashes  NEURO: Normal strength and tone, sensory exam grossly normal, mentation intact and speech normal  PSYCH: mentation appears normal and affect normal/bright  LYMPHATICS: No axillary, cervical, inguinal, or supraclavicular nodes      (M51.16) Lumbar disc herniation with radiculopathy  (primary encounter diagnosis)  Comment:   Plan: PHYSICAL THERAPY REFERRAL        We discussed the likely cause as pressure on the right L4-5 nerve from a herniated disc. Modify activities and avoid  Repetitive bending and twisting and lifting. Physical therapy with modalities and traction is ordered. If not better quickly then consideration for   An MRI of the lumbar spine without contrast would be done. Call to schedule at [phone] for this, as I ordered it. Toradol at 10 mg with food every 6 hours as needed.   There is only 20 pills.     Danial Fitzpatrick MD          "

## 2020-02-13 ENCOUNTER — TELEPHONE (OUTPATIENT)
Dept: FAMILY MEDICINE | Facility: CLINIC | Age: 60
End: 2020-02-13

## 2020-02-13 ENCOUNTER — RADIOLOGY INJECTION OFFICE VISIT (OUTPATIENT)
Dept: PALLIATIVE MEDICINE | Facility: CLINIC | Age: 60
End: 2020-02-13
Payer: COMMERCIAL

## 2020-02-13 ENCOUNTER — ANCILLARY PROCEDURE (OUTPATIENT)
Dept: RADIOLOGY | Facility: CLINIC | Age: 60
End: 2020-02-13
Attending: PAIN MEDICINE
Payer: COMMERCIAL

## 2020-02-13 VITALS
RESPIRATION RATE: 16 BRPM | SYSTOLIC BLOOD PRESSURE: 150 MMHG | HEART RATE: 77 BPM | DIASTOLIC BLOOD PRESSURE: 90 MMHG | OXYGEN SATURATION: 97 %

## 2020-02-13 DIAGNOSIS — M47.812 CERVICAL SPONDYLOSIS WITHOUT MYELOPATHY: Primary | ICD-10-CM

## 2020-02-13 DIAGNOSIS — M47.26 OSTEOARTHRITIS OF SPINE WITH RADICULOPATHY, LUMBAR REGION: Primary | ICD-10-CM

## 2020-02-13 DIAGNOSIS — M47.812 ARTHROPATHY OF CERVICAL FACET JOINT: ICD-10-CM

## 2020-02-13 PROCEDURE — 64490 INJ PARAVERT F JNT C/T 1 LEV: CPT | Mod: 50 | Performed by: PAIN MEDICINE

## 2020-02-13 PROCEDURE — 64491 INJ PARAVERT F JNT C/T 2 LEV: CPT | Mod: 50 | Performed by: PAIN MEDICINE

## 2020-02-13 RX ORDER — OXYCODONE AND ACETAMINOPHEN 5; 325 MG/1; MG/1
1 TABLET ORAL EVERY 6 HOURS PRN
Qty: 15 TABLET | Refills: 0 | Status: SHIPPED | OUTPATIENT
Start: 2020-02-13 | End: 2020-02-16

## 2020-02-13 ASSESSMENT — PAIN SCALES - GENERAL: PAINLEVEL: MODERATE PAIN (5)

## 2020-02-13 NOTE — TELEPHONE ENCOUNTER
Dr. Fitzpatrick,    Patient is contacted about his toradol not controlling his pain.  Patient is at the chiropractors office right now and we can not talk long.  He has an ortho appt scheduled for Tuesday.  He has PT scheduled for Thursday.  He is using toradol and tylenol without relief of pain.  He is using topical OTC meds also like icy hot.  Patient wants something more to help until he can get to these appts.  Please advise. [place] VARGHESE RN

## 2020-02-13 NOTE — PATIENT INSTRUCTIONS
[facility]   Procedure Discharge Instructions    Today you saw:     Dr. Saw Howe      You had an:  Cervical facet joint injection     Medications used:  Lidocaine   Bupivacaine   Dexamethasone Omnipaque              If you were holding your blood thinning medication, please restart taking it: N/A    Be cautious when walking. Numbness and/or weakness in the lower extremities may occur for up to 6-8 hours after the procedure due to effect of the local anesthetic    Do not drive for 6 hours. The effect of the local anesthetic could slow your reflexes.     You may resume your regular activities after 24 hours    Avoid strenuous activity for the first 24 hours    You may shower, however avoid swimming, tub baths or hot tubs for 24 hours following your procedure    You may have a mild to moderate increase in pain for several days following the injection.    It may take up to 14 days for the steroid medication to start working although you may feel the effect as early as a few days after the procedure.       You may use ice packs for 10-15 minutes, 3 to 4 times a day at the injection site for comfort    Do not use heat to painful areas for 6 to 8 hours. This will give the local anesthetic time to wear off and prevent you from accidentally burning your skin.     Unless you have been directed to avoid the use of anti-inflammatory medications (NSAIDS), you may use medications such as ibuprofen, Aleve or Tylenol for pain control if needed.     Possible side effects of steroids that you may experience include flushing, elevated blood pressure, increased appetite, mild headaches and restlessness.  All of these symptoms will get better with time.    If you experience any of the following, call the Pain Clinic during work hours (Mon-Friday 8-4:30 pm) at [phone] or the Provider Line after hours at [phone]:  -Fever over 100 degree F  -Swelling, bleeding, redness, drainage, warmth at  the injection site  -Progressive weakness or numbness in your legs   -Unusual new onset of pain that is not improving

## 2020-02-13 NOTE — TELEPHONE ENCOUNTER
I can only give him a few pain pills, #15 of Oxycodone with no refill. Use them carefully and  Do not drive or use alcohol on them. Please notify prescription ready to  at clinic.    Danial Fitzpatrick MD

## 2020-02-13 NOTE — NURSING NOTE
Discharge Information    IV Discontiued Time:  NA    Amount of Fluid Infused:  NA    Discharge Criteria = When patient returns to baseline or as per MD order    Consciousness:  Pt is fully awake    Circulation:  BP +/- 20% of pre-procedure level    Respiration:  Patient is able to breathe deeply    O2 Sat:  Patient is able to maintain O2 Sat >92% on room air    Activity:  Moves 4 extremities on command    Ambulation:  Patient is able to stand and walk or stand and pivot into wheelchair    Dressing:  Clean/dry or No Dressing    Notes:   Discharge instructions and AVS given to patient    Patient meets criteria for discharge?  YES    Admitted to PCU?  No    Responsible adult present to accompany patient home?  Yes    Signature/Title:    Bartolo Bills RN  RN Care Coordinator  [facility] [place]

## 2020-02-13 NOTE — TELEPHONE ENCOUNTER
Reason for call:  Patient reporting a symptom    Symptom or request: Pt was seen yesterday for back pain and the Toradol Rx is not helping.    [place]  Please call patient and advise.      Duration (how long have symptoms been present): ongoing    Have you been treated for this before? Yes    Additional comments:     Phone Number patient can be reached at:  Cell number on file:    Telephone Information:   Mobile [phone]       Best Time:  any    Can we leave a detailed message on this number:  YES    Call taken on 2/13/2020 at 2:55 PM by Ileana Gagnon

## 2020-02-13 NOTE — NURSING NOTE
Pre-procedure Intake    Have you been fasting? NA    If yes, for how long? NA    Are you taking a prescribed blood thinner such as coumadin, Plavix, Xarelto?    No    If yes, when did you take your last dose? NA    Do you take aspirin?  No    If cervical procedure, have you held aspirin for 6 days?   NA    Do you have any allergies to contrast dye, iodine, steroid and/or numbing medications?  NO    Are you currently taking antibiotics or have an active infection?  NO    Have you had a fever/elevated temperature within the past week? NO    Are you currently taking oral steroids? NO    Do you have a ? Yes       Are you pregnant or breastfeeding?  Not Applicable    Are the vital signs normal?  Yes      Mansi Paulino CMA ([facility])

## 2020-02-14 NOTE — TELEPHONE ENCOUNTER
Rx placed at the  ready to  patient notified with the message.  Kimberlee Rodriguez on 2/14/2020 at 7:35 AM

## 2020-02-14 NOTE — PROGRESS NOTES
Pre procedure Diagnosis: cervical facet arthropathy, cervical spondylosis   Post procedure Diagnosis: Same  Procedure performed: cervical facet joint injections at C 4-5, 5-6 bilaterally, fluoroscopically guided, contrast controlled  Indication:  Therapeutic for pain  Anesthesia: none  Complication:  none  Operators: Saw Howe MD                                    Indications:   Tip Hernandez Jr. is a 59 year old male was sent for cervical facet procedures.  The patient has a history of chronic axial neck pain.  Exam shows tenderness to palpation along the lower neck and reproduction of pain with extension and lateral rotation of the cervical spine.  They have tried conservative treatment including meds/prior procedures     Options/alternatives, benefits and risks were discussed with the patient including bleeding, infection, flared pain, tissue trauma, exposure to radiation, reaction to medications including seizure, spinal cord injury, paralysis, weakness, numbness and headache.     Questions were answered to his satisfaction and he wishes to proceed. Voluntary informed consent was obtained and signed.      Vitals were reviewed: Yes  BP (!) 150/90   Pulse 77   Resp 16   SpO2 97%   Allergies were reviewed:  Yes   Medications were reviewed:  Yes   Pre-procedure pain score: 5/10     Procedure:  After obtaining signed informed consent, the patient was brought into the procedure suite and was placed in a prone position on the [facility] Frame.   A Pause for the Cause was performed.  The patient was prepped and draped in the usual sterile fashion.      Under AP fluoroscopic guidance the C 4-5, 5-6 facet joints on the right/left side were identified, and the C-arm was rotated caudally to open the joint spaces. Then, Lidocaine 1% was injected at the needle entry points and needle tracts for local anesthetic. Then 27 gauge 3.5 inch spinal needles were inserted and advanced under AP and Lateral fluoroscopic  guidance into the facet joints with positive pain reproduction at all the levels.  Aspiration was negative at all the levels.      Then,  Omnipaque 300 contrast dye was injected after negative aspiration for heme and CSF in each joint, confirming appropriate placement.  A total of 1 ml of Omnipaque was used.  Omnipaque wasted:  9 ml.     Then, each facet joint was injected with 0.5 ml of a combination of dexamethasone 10 mg and bupivacaine 0.25% 1 ml for a total injectate volume of 2 ml and the needles were flushed with Lidocaine 1% as they were withdrawn.       Hemostasis was achieved, the area was cleaned, and bandaids were placed when appropriate.  The patient tolerated the procedure well, and was taken to the recovery room.    Images were saved to PACS.     Post-procedure pain score: 5/10  Follow-up includes:   -f/u phone call in one week  -f/u with referring provider     Saw Howe MD

## 2020-02-17 ENCOUNTER — HOSPITAL ENCOUNTER (OUTPATIENT)
Dept: MRI IMAGING | Facility: CLINIC | Age: 60
Discharge: HOME | End: 2020-02-17
Attending: FAMILY MEDICINE | Admitting: FAMILY MEDICINE
Payer: COMMERCIAL

## 2020-02-17 DIAGNOSIS — M51.16 LUMBAR DISC HERNIATION WITH RADICULOPATHY: ICD-10-CM

## 2020-02-17 PROCEDURE — 72148 MRI LUMBAR SPINE W/O DYE: CPT

## 2020-02-18 ENCOUNTER — TELEPHONE (OUTPATIENT)
Dept: FAMILY MEDICINE | Facility: CLINIC | Age: 60
End: 2020-02-18

## 2020-02-18 DIAGNOSIS — M47.26 OSTEOARTHRITIS OF SPINE WITH RADICULOPATHY, LUMBAR REGION: Primary | ICD-10-CM

## 2020-02-18 NOTE — TELEPHONE ENCOUNTER
Reason for call:    Symptom or request:     Patient called stating that he had a MRI-back requesting orders for back injection.      Best Time:  any    Can we leave a detailed message on this number?  YES     Riya MARKS  Station

## 2020-02-19 ENCOUNTER — TELEPHONE (OUTPATIENT)
Dept: PALLIATIVE MEDICINE | Facility: CLINIC | Age: 60
End: 2020-02-19

## 2020-02-19 NOTE — TELEPHONE ENCOUNTER
Patient notified of recommendations from provider.  Patient verbalized understanding and reports that he will take Tylenol 650 mg as directions indicate on the manufacturers bottle.  Patient asked for medical records phone number to get report from MRI.  Gave patient phone number, then transferred him to [facility] [phone].    Kina GUILLAUME Rn

## 2020-02-19 NOTE — TELEPHONE ENCOUNTER
"Pt requesting to get this done either today or the latest Monday, pt was informed that we needed 2wks in between injections. His last was [place] Cervical Facet  w/Shyam. He is asking to speak with a nurse today.      Pre-screening Questions for Radiology Injections:    Injection to be done at which interventional clinic site? [facility]    Instruct patient to arrive as directed prior to the scheduled appointment time:    [place] minutes before      [place]: 30 minutes before; if IV needed 1 hour before     Dr. Ball-no IV needed for Cervical GUSTAVO; please instruct to arrive 30\" early    Procedure ordered by Tost[place]    Procedure ordered? LESI      Transforaminal Cervical GUSTAVO - no pain provider currently performing    What insurance would patient like us to bill for this procedure? [employer]      Worker's comp or MVA (motor vehicle accident) -Any injection DO NOT SCHEDULE and route to Kat Royce.      [employer] insurance - For SI joint injections, DO NOT SCHEDULE and route Kat Crane.       [employer] - Any injection besides hip/shoulder/knee joint DO NOT SCHEDULE and route to Kat Crane. She will obtain PA and call pt back to schedule procedure or notify pt of denial.       [employer]-Route to [place] for review      **BCBS- ALL need to be routed to [place] for review if a PA is needed**      IF SCHEDULING IN [place] AND NEEDS A PA, IT IS OKAY TO SCHEDULE. [place] HANDLES THEIR OWN PA'S AFTER THE PATIENT IS SCHEDULED. PLEASE SCHEDULE AT LEAST 1 WEEK OUT SO A PA CAN BE OBTAINED.    Any chance of pregnancy? NO   If YES, do NOT schedule and route to RN Line [place]    Is an  needed? No     Patient has a drive home? (mandatory) YES: ok    Is patient taking any blood thinners (i.e. plavix, coumadin, jantoven, warfarin, heparin, pradaxa or dabigatran, etc)? No   If hold needed, do NOT schedule, route to RN pool     Is patient taking any aspirin products (includes Excedrin and " Fiorinal)? No     If more than 325mg/day, OK to schedule; Instruct pt to decrease to less than 325 mg for 7 days AND route to RN pool    For CERVICAL procedures, hold all aspirin products for 6 days.     Tell pt that if aspirin product is not held for 6 days, the procedure WILL BE cancelled.      Does the patient have a bleeding or clotting disorder? No     If YES, okay to schedule AND route to RN nurse pool    For any patients with platelet count <100, must be forwarded to provider    Is patient diabetic?  No  If YES, instruct them to bring their glucometer.    Does patient have an active infection or treated for one within the past week? No     Is patient currently taking any antibiotics?  No     For patients on chronic, preventative, or prophylactic antibiotics, procedures may be scheduled.     For patients on antibiotics for active or recent infection:antibiotic course must have been completed for 4 days    Is patient currently taking any steroid medications? (i.e. Prednisone, Medrol)  No     For patients on steroid medications, course must have been completed for 4 days    Is patient actively being treated for cancer or immunocompromised? No  If YES, do NOT schedule and route to RN pool     Are you able to get on and off an exam table with minimal or no assistance? Yes  If NO, do NOT schedule and route to RN pool    Are you able to roll over and lay on your stomach with minimal or no assistance? Yes  If NO, do NOT schedule and route to RN pool     Any allergies to contrast dye, iodine, shellfish, or numbing and steroid medications? No  If YES, route to RN pool AND add allergy information to appointment notes    Allergies: Patient has no known allergies.      Has the patient had a flu shot or any other vaccinations within 7 days before or after the procedure.  No     Does patient have an MRI/CT?  YES: MRI  Check Procedure Scheduling Grid to see if required.      Was the MRI done within the last 3 years?   Yes    If yes, [facility], [facility], [facility] etc? [facility]      If no, do not schedule and route to RN pool    If MRI was not done at [place], [facility] or [facility] do NOT schedule and route to RN pool.      If pt has an imaging disc, the injection MAY be scheduled but pt has to bring disc to appt.     If they show up without the disc the injection cannot be done    Procedure Specific Instructions:      If celiac plexus block, informed patient NPO for 6 hours and that it is okay to take medications with sips of water, especially blood pressure medications  Not Applicable         If this is for a cervical procedure, informed patient that aspirin needs to be held for 6 days.   NO      If IV needed:    Do not schedule procedures requiring IV placement in the first appointment of the day or first appointment after lunch. Do NOT schedule at 0745, 0815 or 1245. ok    Instructed pt to arrive 30 minutes early for IV start if required. (Check Procedure Scheduling Grid)  YES: ok    Reminders:      If you are started on any steroids or antibiotics between now and your appointment, you must contact us because the procedure may need to be cancelled.  Yes      For all procedures except radiofrequency ablations (RFAs) and spinal cord stimulator (SCS) trials, informed patient:    IV sedation is not provided for this procedure.  If you feel that an oral anti-anxiety medication is needed, you can discuss this further with your referring provider or primary care provider.  The Pain Clinic provider will discuss specifics of what the procedure includes at your appointment.  Most procedures last 10-20 minutes.  We use numbing medications to help with any discomfort during the procedure.  NO      For patients 85 or older we recommend having an adult stay w/ them for the remainder of the day.   ok    Does the patient have any questions?  YES  Deisy Mak  [facility]

## 2020-02-19 NOTE — TELEPHONE ENCOUNTER
Patient notified of referral for epidural injection  Transferred him to make and appointment    Patient is requesting refill of Toradol 10 mg.  He has one tablet left.  He is not taking Percocet, he does not like the way it makes him feel.  Please advise  Pharm cued    Routing to provider.    Kina GUILLAUME Rn

## 2020-02-19 NOTE — TELEPHONE ENCOUNTER
Patient reports:  He had MRI of lumbar spine on 2/17/20.  Patient is asking for orders for  lumbar injection  Please advise    Routing to provider.    Kina GUILLAUME Rn

## 2020-02-20 ENCOUNTER — TELEPHONE (OUTPATIENT)
Dept: FAMILY MEDICINE | Facility: CLINIC | Age: 60
End: 2020-02-20

## 2020-02-20 NOTE — TELEPHONE ENCOUNTER
Reason for Call:  Epidural injection    Detailed comments: patient is calling and stating that he talked with the Pain Clinic and they told him he needed to wait 2 weeks in between procedures. Patient is leaving for vacation on Monday, and would like to get this done, as he can barely walk. Please advise. Please see the Epic note from Pain Clinic.    Phone Number Patient can be reached at: Home number on file [phone].    Best Time: any    Can we leave a detailed message on this number? YES   Daja Baker  Clinic Station        Call taken on 2/20/2020 at 12:18 PM by Daja Veliz

## 2020-02-20 NOTE — TELEPHONE ENCOUNTER
Pt asking for a call back from nursing to discuss having the LESI with Dr Howe before 2wk's. He was informed of the policy but thinks because it is in a different spot it should be okay. He is headed out of town tomorrow (Friday) but can leave late Monday if we okay the injection.      Verito BROOKS    [facility] [place]

## 2020-02-20 NOTE — TELEPHONE ENCOUNTER
Patient had cervical injection 2-13-20.  He is to have another injection, lumbar however cannot be done sooner than 2 weeks from 2-13-20 injection.    He leaves on a family vacation tomorrow.  States he can no longer take toradol and oxycodone did not help.  Any other med recommendations for him while out of town on vacation - so he can at least walk.    Routing to provider.  Tesha GUILLAUME RN

## 2020-02-20 NOTE — TELEPHONE ENCOUNTER
Pt returning call to nursing. He is asking for a call back to discuss message below      Verito BROOKS    [facility] [place]

## 2020-02-20 NOTE — TELEPHONE ENCOUNTER
Pt calling back.  Informed him that it has to be 2 weeks.  He was asking for 1 day before.  Writer reviewed this and our schedule is full.  Informed him to call back when he is ready to schedule.    Meme Titus RN-BSN  [facility]

## 2020-02-20 NOTE — TELEPHONE ENCOUNTER
Toradol cannot be refilled. This is a one time therapy.   He must use the OTC NSAIDs and Tylenol. Narcotics are not used for this, and oral steroids have too many side effects. .Danial Fitzpatrick MD

## 2020-02-20 NOTE — TELEPHONE ENCOUNTER
We are unable to accommodate pt request.  There has to be 2 weeks between procedures.    Called pt and left message relaying the above.  Advised her to call back to schedule when she is ready.    Meme Titus RN-BSN  [facility]

## 2020-02-21 ENCOUNTER — HOSPITAL ENCOUNTER (OUTPATIENT)
Dept: PHYSICAL THERAPY | Facility: CLINIC | Age: 60
Setting detail: THERAPIES SERIES
End: 2020-02-21
Attending: FAMILY MEDICINE
Payer: COMMERCIAL

## 2020-02-21 PROCEDURE — 97140 MANUAL THERAPY 1/> REGIONS: CPT | Mod: GP | Performed by: PHYSICAL THERAPIST

## 2020-02-21 PROCEDURE — 97110 THERAPEUTIC EXERCISES: CPT | Mod: GP | Performed by: PHYSICAL THERAPIST

## 2020-02-21 PROCEDURE — 97161 PT EVAL LOW COMPLEX 20 MIN: CPT | Mod: GP | Performed by: PHYSICAL THERAPIST

## 2020-02-21 NOTE — PROGRESS NOTES
02/21/20 0800   General Information   Type of Visit Initial OP Ortho PT Evaluation   Start of Care Date 02/21/20   Referring Physician Tosteson   Orders Evaluate and Treat   Orders Comment trial traction   Date of Order 02/12/20   Certification Required? No   Medical Diagnosis lumbar disc herniation with radiculopathy    Surgical/Medical history reviewed Yes   Precautions/Limitations right hip precautions   General Information Comments PMH: right hip replacement, right ankle fusion, HTN   Body Part(s)   Body Part(s) Lumbar Spine/SI   Presentation and Etiology   Pertinent history of current problem (include personal factors and/or comorbidities that impact the POC) He got out of bed 2/2/20 and the lower back got worse and worse with pain. He will get pain on the top of the thigh up to the low back. The pain is constant and goes away for along time while he is sitting. Lying down and sleeping hasn't been any issues. The more he is up walking around, the worse the pain gets.    Impairments A. Pain;B. Decreased WB tolerance;E. Decreased flexibility;H. Impaired gait   Functional Limitations perform activities of daily living;perform required work activities   Symptom Location right low back and leg   How/Where did it occur From insidious onset   Onset date of current episode/exacerbation 02/02/20   Chronicity New   Pain rating (0-10 point scale) Best (/10);Worst (/10)   Best (/10) 5   Worst (/10) 9   Pain quality A. Sharp;E. Shooting;F. Stabbing   Frequency of pain/symptoms C. With activity   Pain/symptoms are: Worse during the day   Pain/symptoms exacerbated by B. Walking   Pain/symptoms eased by C. Rest;G. Heat;H. Cold;I. OTC medication(s)   Progression of symptoms since onset: Unchanged   Current / Previous Interventions   Diagnostic Tests: X-ray   X-ray Results Results   Prior Level of Function   Prior Level of Function-Mobility independent   Prior Level of Function-ADLs independent   Functional Level Prior  Comment independent   Current Level of Function   Current Community Support Family/friend caregiver   Patient role/employment history A. Employed   Employment Comments construction- self employed -boss   Living environment House/townhome   Home/community accessibility no concerns   Current equipment-Gait/Locomotion None   Current equipment-ADL None   Fall Risk Screen   Fall screen completed by PT   Have you fallen 2 or more times in the past year? No   Have you fallen and had an injury in the past year? No   Is patient a fall risk? No   Abuse Screen (yes response referral indicated)   Feels Unsafe at Home or Work/School no   Feels Threatened by Someone no   Does Anyone Try to Keep You From Having Contact with Others or Doing Things Outside Your Home? no   Physical Signs of Abuse Present no   System Outcome Measures   Outcome Measures Low Back Pain (see Oswestry and Nigel)   Lumbar Spine/SI Objective Findings   Integumentary no concerns   Flexion ROM finger tips to floor no pain    Extension ROM 50% limited pain   Right Side Bending ROM finger tips to knee- increased pain in buttock   Left Side Bending ROM finger tips to knee- no pain    Repeated Extension-Standing ROM no change   Repeated Flexion-Standing ROM slight increase in pain over buttock and right knee   Lumbar ROM Comment trunk rotatio 25% limited B no pain    Pelvic Screen SI testing: sacral thrust + on right, distraction -, approximation -, sacral compression -    Hip Screen iliac crest and ASIS even B    Transversus Abdominus Strength (Nabeel Leg Lowering-deg) able to engage with cueing - hold breathe   Hip Flexion (L2) Strength 4-/5 Right, 4+/5 L    Hip Abduction Strength 4/5 R, 4+/5 L    Hip Adduction Strength 5/5 B    Hip Extension Strength 4+/5 B    Knee Flexion Strength 5/5 B    Knee Extension (L3) Strength 4+/5 R, 5/5 L    Ankle Dorsiflexion (L4) Strength 5/5 B    Great Toe Extension (L5) Strength 5/5 B    Ankle Plantar Flexion (S1) Strength 5/5 B     Hamstring Flexibility 90/90: 20 degrees from 0 R    Quadricep Flexibility mild tightness R    SLR + on the right with positive Lesauges    Magdi Test negative for pain in back but had increased tension across quad    Ely Test negative R    Repeated Extension Prone prone on elbows- pain resolved    Slump Test + for the right for pain over the knee   Spring Test moderate hypomobility L1-5 no pain    Segmental Mobility moderate hypomobility L1-5 no pain    Sensation Testing WNL B LE    Palpation increased tone to left lumbar paraspinals, moderate tension over right glut min, non tender over piriformis    Planned Therapy Interventions   Planned Therapy Interventions balance training;bed mobility training;joint mobilization;manual therapy;ROM;strengthening;stretching;transfer training;neuromuscular re-education;gait training   Planned Modality Interventions   Planned Modality Interventions Traction   Clinical Impression   Criteria for Skilled Therapeutic Interventions Met yes, treatment indicated   PT Diagnosis low back pain with radiation of symptoms to right leg   Influenced by the following impairments lumbar hypomobility, + sciatic nerve tension, weakness in L2/3 myotome, ositive Sacral thrust test, weak core   Functional limitations due to impairments standing, walking, working,    Clinical Presentation Stable/Uncomplicated   Clinical Presentation Rationale clinical decision making and chart review   Clinical Decision Making (Complexity) Low complexity   Therapy Frequency 1 time/week   Predicted Duration of Therapy Intervention (days/wks) 10 weeks   Risk & Benefits of therapy have been explained Yes   Patient, Family & other staff in agreement with plan of care Yes   Clinical Impression Comments Patient is a 59 year old male who presents with acute onset of radiating right leg pain. Signs and symptoms consistent with disc pathology and symptoms resolve wtih prone on elbows extension based therapy.    Education  Assessment   Preferred Learning Style Listening;Demonstration   Barriers to Learning No barriers   ORTHO GOALS   PT Ortho Eval Goals 1;2;3;4   Ortho Goal 1   Goal Identifier 1   Goal Description Patient will be able to walk for 1 hour without increased low back pain.    Target Date 03/20/20   Ortho Goal 2   Goal Identifier 2   Goal Description Patient will be independent in his HEP in order to complete exercises outside of PT.    Target Date 03/20/20   Ortho Goal 3   Goal Identifier 3   Goal Description Patient will be able to complete his day at work without increased radiating right leg pain.    Target Date 05/01/20   Ortho Goal 4   Goal Identifier 4   Goal Description Patient will improve DILIP score to less than 15 in order to show improved functional status.    Target Date 05/01/20   Total Evaluation Time   PT Eval, Low Complexity Minutes (64819) 27       Dorothea Carballo  PT, DPT       2/21/2020   [address] [address]  [email or web address]  Voicemail: [phone]

## 2020-06-30 ENCOUNTER — TELEPHONE (OUTPATIENT)
Dept: FAMILY MEDICINE | Facility: CLINIC | Age: 60
End: 2020-06-30

## 2020-06-30 NOTE — TELEPHONE ENCOUNTER
Reason for Call:  Other Cortisone shot - FYI    Detailed comments: Patient wants cortisone shot in left knee, has not had it done for maybe three years from Dr. Fitzpatrick. Set up appt for patient for Thu, 7/2/2020. If this is not okay, please call patient.    Phone Number Patient can be reached at: Home number on file [phone] (home)    Best Time: Any    Can we leave a detailed message on this number? YES    Call taken on 6/30/2020 at 9:29 AM by Daja Frances

## 2020-07-02 ENCOUNTER — OFFICE VISIT (OUTPATIENT)
Dept: FAMILY MEDICINE | Facility: CLINIC | Age: 60
End: 2020-07-02
Payer: COMMERCIAL

## 2020-07-02 VITALS
SYSTOLIC BLOOD PRESSURE: 134 MMHG | BODY MASS INDEX: 38.08 KG/M2 | DIASTOLIC BLOOD PRESSURE: 86 MMHG | HEART RATE: 81 BPM | WEIGHT: 266 LBS | OXYGEN SATURATION: 96 % | HEIGHT: 70 IN | TEMPERATURE: 97.5 F | RESPIRATION RATE: 16 BRPM

## 2020-07-02 DIAGNOSIS — M25.562 LEFT KNEE PAIN, UNSPECIFIED CHRONICITY: Primary | ICD-10-CM

## 2020-07-02 PROCEDURE — 20610 DRAIN/INJ JOINT/BURSA W/O US: CPT | Mod: LT | Performed by: FAMILY MEDICINE

## 2020-07-02 RX ORDER — TRIAMCINOLONE ACETONIDE 40 MG/ML
40 INJECTION, SUSPENSION INTRA-ARTICULAR; INTRAMUSCULAR ONCE
Status: COMPLETED | OUTPATIENT
Start: 2020-07-02 | End: 2020-07-02

## 2020-07-02 RX ADMIN — TRIAMCINOLONE ACETONIDE 40 MG: 40 INJECTION, SUSPENSION INTRA-ARTICULAR; INTRAMUSCULAR at 11:38

## 2020-07-02 ASSESSMENT — MIFFLIN-ST. JEOR: SCORE: 2012.82

## 2020-07-02 NOTE — PROGRESS NOTES
"Subjective     Tip Hernandez Jr. is a 60 year old male who presents to clinic today for the following health issues:    HPI   Musculoskeletal problem/pain      Duration: 2 months again now for the pain.    Description  Location: Left inner knee pain.    Intensity:  5-6/10    Accompanying signs and symptoms: weakness of the left leg at times.    History  Previous similar problem: YES  Previous evaluation:  Clinic visit with an injection.  Looks like last injection for the left knee was 7-7-2017.    Precipitating or alleviating factors:  Trauma or overuse: no   Aggravating factors include: Unlevel ground walking for his work.    Therapies tried and outcome: Advil and Tylenol alternating.      Current Outpatient Medications   Medication Instructions     ibuprofen (ADVIL/MOTRIN) 600 mg, Oral, EVERY 4 HOURS PRN     lisinopril (ZESTRIL) 20 mg, Oral, DAILY     MULTI-DAY VITAMINS OR TABS 1 daily     omeprazole (PRILOSEC) 40 MG DR capsule TAKE 1 CAPSULE BY MOUTH ONCE DAILY 30-60 MINUTES BEFORE A MEAL     TYLENOL 500 MG OR TABS 2 tabs [id number]       Patient Active Problem List   Diagnosis     Essential hypertension, benign     Obesity     Hyperlipidemia with target LDL less than 130     RA (rheumatoid arthritis) (H)     Osteoarthritis     Eczema     DJD (degenerative joint disease), lumbar     DJD (degenerative joint disease) of cervical spine     GERD (gastroesophageal reflux disease)     Primary osteoarthritis of right ankle     Primary osteoarthritis of left knee       Blood pressure 134/86, pulse 81, temperature 97.5  F (36.4  C), temperature source Tympanic, resp. rate 16, height 1.778 m (5' 10\"), weight 120.7 kg (266 lb), SpO2 96 %.    Exam:  GENERAL APPEARANCE: healthy, alert and no distress  MS: extremities normal- no gross deformities noted, no evidence of inflammation in joints, FROM in all extremities.  The left knee is not tender. And the patella is normal to palpation. No redness or warmth.   SKIN: no " suspicious lesions or rashes  PSYCH: mentation appears normal and affect normal/bright    (M25.562) Left knee pain, unspecified chronicity  (primary encounter diagnosis)  Comment:   Plan: DRAIN/INJECT LARGE JOINT/BURSA, triamcinolone         (KENALOG-40) injection 40 mg        We discussed the options and since he did so well in 2017 with the cortisone, injection of 1 cc of Kenalog-40, with 11 cc of 1% Lidocaine  Will be injected with Betadine for prep in the left knee medial and superior compartment. Use ice and Tylenol and Advil as needed.   If not better then call [phone] just for the X-ray appt. I ordered it. We will call the results.       Danial Fitzpatrick MD

## 2020-07-02 NOTE — PATIENT INSTRUCTIONS
Thank you for choosing [facility].  You may be receiving an email and/or telephone survey request from [facility] Customer Experience regarding your visit today.  Please take a few minutes to respond to the survey to let us know how we are doing.      If you have questions or concerns, please contact us via [employer] or you can contact your care team at [phone].    Our Clinic hours are:  Monday 6:40 am  to 7:00 pm  Tuesday -Friday 6:40 am to 5:00 pm    The [place] outpatient lab hours are:  Monday - Friday 6:10 am to 4:45 pm  Saturdays 7:00 am to 11:00 am  Appointments are required, call [phone]    If you have clinical questions after hours or would like to schedule an appointment,  call the clinic at [phone].    (M25.562) Left knee pain, unspecified chronicity  (primary encounter diagnosis)  Comment:   Plan: DRAIN/INJECT LARGE JOINT/BURSA, triamcinolone         (KENALOG-40) injection 40 mg        We discussed the options and since he did so well in 2017 with the cortisone, injection of 1 cc of Kenalog-40, with 11 cc of 1% Lidocaine  Will be injected with Betadine for prep in the left knee medial and superior compartment. Use ice and Tylenol and Advil as needed.   If not better then call [phone] just for the X-ray appt. I ordered it. We will call the results.

## 2020-09-24 DIAGNOSIS — K21.9 GASTROESOPHAGEAL REFLUX DISEASE WITHOUT ESOPHAGITIS: ICD-10-CM

## 2020-09-24 RX ORDER — OMEPRAZOLE 40 MG/1
CAPSULE, DELAYED RELEASE ORAL
Qty: 90 CAPSULE | Refills: 2 | Status: SHIPPED | OUTPATIENT
Start: 2020-09-24 | End: 2021-09-07

## 2020-10-21 ENCOUNTER — TRANSFERRED RECORDS (OUTPATIENT)
Dept: HEALTH INFORMATION MANAGEMENT | Facility: CLINIC | Age: 60
End: 2020-10-21

## 2020-11-05 ENCOUNTER — TRANSFERRED RECORDS (OUTPATIENT)
Dept: HEALTH INFORMATION MANAGEMENT | Facility: CLINIC | Age: 60
End: 2020-11-05

## 2020-11-11 DIAGNOSIS — I10 ESSENTIAL HYPERTENSION, BENIGN: ICD-10-CM

## 2020-11-11 NOTE — PROGRESS NOTES
Physical Therapy Discharge Summary    Tpi Hernandez Jr. has completed the ordered physical therapy evaluation. Treatment recommendations were made, but pt has not returned for any further recommended PT sessions.  Pt was unable to be re-assessed, and present status is unknown.    Please contact me with any questions or concerns.  Thank you for your referral.    Dorothea Carballo, PT DPT  11/11/2020

## 2020-11-12 RX ORDER — LISINOPRIL 20 MG/1
TABLET ORAL
Qty: 90 TABLET | Refills: 3 | Status: SHIPPED | OUTPATIENT
Start: 2020-11-12

## 2020-11-12 NOTE — TELEPHONE ENCOUNTER
"Requested Prescriptions   Pending Prescriptions Disp Refills     lisinopril (ZESTRIL) 20 MG tablet [Pharmacy Med Name: LISINOPRIL 20MG TABLET] 90 tablet 3     Sig: TAKE 1 TABLET BY MOUTH ONCE DAILY       ACE Inhibitors (Including Combos) Protocol Failed - 11/11/2020  5:33 PM        Failed - Normal serum creatinine on file in past 12 months     Recent Labs   Lab Test 10/18/18  1603   CR 0.82       Ok to refill medication if creatinine is low          Failed - Normal serum potassium on file in past 12 months     Recent Labs   Lab Test 10/18/18  1603   POTASSIUM 3.9             Passed - Blood pressure under 140/90 in past 12 months     BP Readings from Last 3 Encounters:   07/02/20 134/86   02/13/20 (!) 150/90   02/12/20 122/82                 Passed - Recent (12 mo) or future (30 days) visit within the authorizing provider's specialty     Patient has had an office visit with the authorizing provider or a provider within the authorizing providers department within the previous 12 mos or has a future within next 30 days. See \"Patient Info\" tab in inbasket, or \"Choose Columns\" in Meds & Orders section of the refill encounter.              Passed - Medication is active on med list        Passed - Patient is age 18 or older             "

## 2020-12-18 ENCOUNTER — TRANSFERRED RECORDS (OUTPATIENT)
Dept: HEALTH INFORMATION MANAGEMENT | Facility: CLINIC | Age: 60
End: 2020-12-18

## 2021-01-06 ENCOUNTER — TELEPHONE (OUTPATIENT)
Dept: FAMILY MEDICINE | Facility: CLINIC | Age: 61
End: 2021-01-06

## 2021-01-06 DIAGNOSIS — I10 ESSENTIAL HYPERTENSION, BENIGN: Primary | ICD-10-CM

## 2021-01-06 NOTE — TELEPHONE ENCOUNTER
Reason for Call: Request for an order or referral:    Order or referral being requested: Lab Orders    Date needed: as soon as possible    Has the patient been seen by the PCP for this problem? YES    Additional comments: Patient has an appointment with Dr. Fitzpatrick on 1/13/21, but he wants to have labs done the day before. He scheduled appointment but there are no orders. Patient stated he would like all routine labs such as cholesterol, thyroid, blood sugar, and anything else Dr. Fitzpatrick would recommend. Please call patient if any questions.    Phone number Patient can be reached at:  Home number on file [phone] (home)    Best Time:  Anytime    Can we leave a detailed message on this number?  YES    Call taken on 1/6/2021 at 10:50 AM by Sunshine Ponce

## 2021-01-12 DIAGNOSIS — I10 ESSENTIAL HYPERTENSION, BENIGN: ICD-10-CM

## 2021-01-12 LAB
CHOLEST SERPL-MCNC: 227 MG/DL
CREAT SERPL-MCNC: 0.83 MG/DL (ref 0.66–1.25)
GFR SERPL CREATININE-BSD FRML MDRD: >90 ML/MIN/{1.73_M2}
HDLC SERPL-MCNC: 52 MG/DL
LDLC SERPL CALC-MCNC: 149 MG/DL
NONHDLC SERPL-MCNC: 175 MG/DL
POTASSIUM SERPL-SCNC: 4.1 MMOL/L (ref 3.4–5.3)
TRIGL SERPL-MCNC: 131 MG/DL

## 2021-01-12 PROCEDURE — 82565 ASSAY OF CREATININE: CPT | Performed by: FAMILY MEDICINE

## 2021-01-12 PROCEDURE — 85025 COMPLETE CBC W/AUTO DIFF WBC: CPT | Performed by: FAMILY MEDICINE

## 2021-01-12 PROCEDURE — 84132 ASSAY OF SERUM POTASSIUM: CPT | Performed by: FAMILY MEDICINE

## 2021-01-12 PROCEDURE — 36415 COLL VENOUS BLD VENIPUNCTURE: CPT | Performed by: FAMILY MEDICINE

## 2021-01-12 PROCEDURE — 80061 LIPID PANEL: CPT | Performed by: FAMILY MEDICINE

## 2021-01-13 ENCOUNTER — OFFICE VISIT (OUTPATIENT)
Dept: FAMILY MEDICINE | Facility: CLINIC | Age: 61
End: 2021-01-13
Payer: COMMERCIAL

## 2021-01-13 VITALS
HEIGHT: 70 IN | OXYGEN SATURATION: 98 % | BODY MASS INDEX: 37.22 KG/M2 | RESPIRATION RATE: 14 BRPM | DIASTOLIC BLOOD PRESSURE: 78 MMHG | WEIGHT: 260 LBS | HEART RATE: 60 BPM | TEMPERATURE: 96.9 F | SYSTOLIC BLOOD PRESSURE: 128 MMHG

## 2021-01-13 DIAGNOSIS — Z01.818 PREOP GENERAL PHYSICAL EXAM: Primary | ICD-10-CM

## 2021-01-13 LAB
BASOPHILS # BLD AUTO: 0 10E9/L (ref 0–0.2)
BASOPHILS NFR BLD AUTO: 0.4 %
DIFFERENTIAL METHOD BLD: NORMAL
EOSINOPHIL # BLD AUTO: 0.1 10E9/L (ref 0–0.7)
EOSINOPHIL NFR BLD AUTO: 1.1 %
ERYTHROCYTE [DISTWIDTH] IN BLOOD BY AUTOMATED COUNT: 11.7 % (ref 10–15)
HCT VFR BLD AUTO: 48.5 % (ref 40–53)
HGB BLD-MCNC: 16.4 G/DL (ref 13.3–17.7)
LYMPHOCYTES # BLD AUTO: 2.6 10E9/L (ref 0.8–5.3)
LYMPHOCYTES NFR BLD AUTO: 30.2 %
MCH RBC QN AUTO: 30 PG (ref 26.5–33)
MCHC RBC AUTO-ENTMCNC: 33.8 G/DL (ref 31.5–36.5)
MCV RBC AUTO: 89 FL (ref 78–100)
MONOCYTES # BLD AUTO: 0.8 10E9/L (ref 0–1.3)
MONOCYTES NFR BLD AUTO: 9.4 %
NEUTROPHILS # BLD AUTO: 5 10E9/L (ref 1.6–8.3)
NEUTROPHILS NFR BLD AUTO: 58.9 %
PLATELET # BLD AUTO: 276 10E9/L (ref 150–450)
RBC # BLD AUTO: 5.47 10E12/L (ref 4.4–5.9)
WBC # BLD AUTO: 8.5 10E9/L (ref 4–11)

## 2021-01-13 PROCEDURE — 99214 OFFICE O/P EST MOD 30 MIN: CPT | Performed by: FAMILY MEDICINE

## 2021-01-13 RX ORDER — METOPROLOL TARTRATE 25 MG/1
25 TABLET, FILM COATED ORAL
COMMUNITY
Start: 2020-12-23 | End: 2021-02-25 | Stop reason: ALTCHOICE

## 2021-01-13 RX ORDER — APIXABAN 5 MG/1
5 TABLET, FILM COATED ORAL 2 TIMES DAILY
COMMUNITY
Start: 2020-12-24

## 2021-01-13 ASSESSMENT — MIFFLIN-ST. JEOR: SCORE: 1985.6

## 2021-01-13 NOTE — PROGRESS NOTES
[facility]  [address] [address]  Phone: [phone]  Primary Provider: Danial Fitzpatrick      PREOPERATIVE EVALUATION:  Today's date: 1/13/2021    Tip Hernandez Jr. is a 60 year old male who presents for a preoperative evaluation.    Surgical Information:  Surgery/Procedure: Left Hip replacement   Surgery Location: [facility]   Surgeon: Dr. Fregoso   Surgery Date: 01/28/21  Time of Surgery: TBD  Where patient plans to recover: At home with family  Fax number for surgical facility: [phone]    Type of Anesthesia Anticipated: to be determined    Subjective     HPI related to upcoming procedure: see above. For arthritis      Preop Questions 1/13/2021   1. Have you ever had a heart attack or stroke? No   2. Have you ever had surgery on your heart or blood vessels, such as a stent placement, a coronary artery bypass, or surgery on an artery in your head, neck, heart, or legs? No   3. Do you have chest pain with activity? No   4. Do you have a history of  heart failure? No   5. Do you currently have a cold, bronchitis or symptoms of other infection? No   6. Do you have a cough, shortness of breath, or wheezing? No   7. Do you or anyone in your family have previous history of blood clots? YES - mother   8. Do you or does anyone in your family have a serious bleeding problem such as prolonged bleeding following surgeries or cuts? No   9. Have you ever had problems with anemia or been told to take iron pills? No   10. Have you had any abnormal blood loss such as black, tarry or bloody stools? No   11. Have you ever had a blood transfusion? No   12. Are you willing to have a blood transfusion if it is medically needed before, during, or after your surgery? Yes   13. Have you or any of your relatives ever had problems with anesthesia? No   14. Do you have sleep apnea, excessive snoring or daytime drowsiness? No   15. Do you have any artifical heart  valves or other implanted medical devices like a pacemaker, defibrillator, or continuous glucose monitor? No   16. Do you have artificial joints? YES - Right hip    17. Are you allergic to latex? No     Health Care Directive:  Patient does not have a Health Care Directive or Living Will: Discussed advance care planning with patient; however, patient declined at this time.    Preoperative Review of :            Review of Systems  CONSTITUTIONAL: NEGATIVE for fever, chills, change in weight  ENT/MOUTH: NEGATIVE for ear, mouth and throat problems  RESP: NEGATIVE for significant cough or SOB  CV: NEGATIVE for chest pain, palpitations or peripheral edema    Patient Active Problem List    Diagnosis Date Noted     Primary osteoarthritis of right ankle 07/07/2017     Priority: Medium     Primary osteoarthritis of left knee 07/07/2017     Priority: Medium     GERD (gastroesophageal reflux disease) 01/19/2015     Priority: Medium     DJD (degenerative joint disease) of cervical spine 01/04/2013     Priority: Medium     Eczema 01/07/2011     Priority: Medium     DJD (degenerative joint disease), lumbar 01/07/2011     Priority: Medium     Hyperlipidemia with target LDL less than 130 01/06/2010     Priority: Medium     , March of 2007.   Diagnosis updated by automated process. Provider to review and confirm.       RA (rheumatoid arthritis) (H) 01/06/2010     Priority: Medium     Diagnosed in 2001.        Osteoarthritis 01/06/2010     Priority: Medium     Left knee, see MRI from 2004. Right hip, March, 2011. Right hip mild DJD in 2010.        Essential hypertension, benign 11/02/2006     Priority: Medium     Obesity      Priority: Medium     Problem list name updated by automated process. Provider to review        Past Medical History:   Diagnosis Date     Degeneration of lumbar or lumbosacral intervertebral disc      Essential hypertension, benign      Obesity, unspecified      Past Surgical History:   Procedure  Laterality Date     INJECT EPIDURAL LUMBAR  2011    INJECT EPIDURAL LUMBAR performed by GENERIC ANESTHESIA PROVIDER at [facility]     INJECT EPIDURAL LUMBAR  2011    Procedure:INJECT EPIDURAL LUMBAR; GUSTAVO Cielo Fitzpatrick; Surgeon:GENERIC ANESTHESIA PROVIDER; Location:[facility]     SURGICAL HISTORY OF -       left knee meniscus repair     Current Outpatient Medications   Medication Sig Dispense Refill     ibuprofen (ADVIL/MOTRIN) 200 MG tablet Take 600 mg by mouth every 4 hours as needed for mild pain       lisinopril (ZESTRIL) 20 MG tablet TAKE 1 TABLET BY MOUTH ONCE DAILY 90 tablet 3     MULTI-DAY VITAMINS OR TABS 1 daily       omeprazole (PRILOSEC) 40 MG DR capsule TAKE 1 CAPSULE BY MOUTH ONCE DAILY 30-60 MINUTES BEFORE A MEAL 90 capsule 2     TYLENOL 500 MG OR TABS 2 tabs [id number]         No Known Allergies     Social History     Tobacco Use     Smoking status: Former Smoker     Quit date: 1992     Years since quittin.0     Smokeless tobacco: Never Used   Substance Use Topics     Alcohol use: Yes     Comment: OCC       History   Drug Use No         Objective     There were no vitals taken for this visit.    Physical Exam  Exam:  GENERAL APPEARANCE: healthy, alert and no distress  EYES: EOMI,  PERRL  HENT: ear canals and TM's normal and nose and mouth without ulcers or lesions  NECK: no adenopathy, no asymmetry, masses, or scars and thyroid normal to palpation  RESP: lungs clear to auscultation - no rales, rhonchi or wheezes  CV: regular rates and rhythm, normal S1 S2, no S3 or S4 and no murmur, click or rub -  ABDOMEN:  soft, nontender, no HSM or masses and bowel sounds normal  MS: the left hip has decreased ROM.   SKIN: no suspicious lesions or rashes  NEURO: Normal strength and tone, sensory exam grossly normal, mentation intact and speech normal  PSYCH: mentation appears normal and affect normal/bright  LYMPHATICS: No axillary, cervical, inguinal, or supraclavicular nodes      Recent Labs   Lab  Test 01/12/21  1158   POTASSIUM 4.1   CR 0.83        Diagnostics:  Labs pending at this time.  Results will be reviewed when available.   No EKG required, no history of coronary heart disease, significant arrhythmia, peripheral arterial disease or other structural heart disease.    Revised Cardiac Risk Index (RCRI):  The patient has the following serious cardiovascular risks for perioperative complications:   - No serious cardiac risks = 0 points     RCRI Interpretation: 0 points: Class I (very low risk - 0.4% complication rate)         Assessment & Plan   The proposed surgical procedure is considered LOW risk.          Risks and Recommendations:  The patient has the following additional risks and recommendations for perioperative complications:   - No identified additional risk factors other than previously addressed    Follow the instructions from the surgeons about the blood thinners. Tylenol is OK.   Your stomach should be empty for 8 hours before surgery.     RECOMMENDATION:  APPROVAL GIVEN to proceed with proposed procedure, without further diagnostic evaluation.    Signed Electronically by: Danial Fitzpatrick MD    Copy of this evaluation report is provided to requesting physician.    Preop [facility] Preop Guidelines    Revised Cardiac Risk Index

## 2021-01-13 NOTE — PATIENT INSTRUCTIONS
Diagnostics:  Labs pending at this time.  Results will be reviewed when available.   No EKG required, no history of coronary heart disease, significant arrhythmia, peripheral arterial disease or other structural heart disease.    Revised Cardiac Risk Index (RCRI):  The patient has the following serious cardiovascular risks for perioperative complications:   - No serious cardiac risks = 0 points     RCRI Interpretation: 0 points: Class I (very low risk - 0.4% complication rate)         Assessment & Plan   The proposed surgical procedure is considered LOW risk.          Risks and Recommendations:  The patient has the following additional risks and recommendations for perioperative complications:   - No identified additional risk factors other than previously addressed    Follow the instructions from the surgeons about the blood thinners. Tylenol is OK.   Your stomach should be empty for 8 hours before surgery.     RECOMMENDATION:  APPROVAL GIVEN to proceed with proposed procedure, without further diagnostic evaluation.

## 2021-01-16 ENCOUNTER — HEALTH MAINTENANCE LETTER (OUTPATIENT)
Age: 61
End: 2021-01-16

## 2021-01-28 ENCOUNTER — TRANSFERRED RECORDS (OUTPATIENT)
Dept: HEALTH INFORMATION MANAGEMENT | Facility: CLINIC | Age: 61
End: 2021-01-28

## 2021-02-11 ENCOUNTER — TRANSFERRED RECORDS (OUTPATIENT)
Dept: HEALTH INFORMATION MANAGEMENT | Facility: CLINIC | Age: 61
End: 2021-02-11

## 2021-02-24 ENCOUNTER — TELEPHONE (OUTPATIENT)
Dept: FAMILY MEDICINE | Facility: CLINIC | Age: 61
End: 2021-02-24

## 2021-02-24 NOTE — TELEPHONE ENCOUNTER
Reason for Call:  Medication request:    Do you use a [facility]?  Name of the pharmacy and phone number for the current request:  [facility] [place] [phone]    Name of the medication requested: Pain reliever he can take with Eliqus, he has bad arthritis    Can we leave a detailed message on this number? YES    Phone number patient can be reached at: Cell number on file:    Telephone Information:   Mobile [phone]       Best Time: Any    Call taken on 2/24/2021 at 2:06 PM by Daja Frances

## 2021-02-25 ENCOUNTER — VIRTUAL VISIT (OUTPATIENT)
Dept: FAMILY MEDICINE | Facility: CLINIC | Age: 61
End: 2021-02-25
Payer: COMMERCIAL

## 2021-02-25 DIAGNOSIS — I48.20 CHRONIC ATRIAL FIBRILLATION (H): Primary | ICD-10-CM

## 2021-02-25 DIAGNOSIS — M17.12 PRIMARY OSTEOARTHRITIS OF LEFT KNEE: ICD-10-CM

## 2021-02-25 PROCEDURE — 99214 OFFICE O/P EST MOD 30 MIN: CPT | Mod: TEL | Performed by: FAMILY MEDICINE

## 2021-02-25 RX ORDER — SOTALOL HYDROCHLORIDE 80 MG/1
1 TABLET ORAL 2 TIMES DAILY
COMMUNITY
Start: 2021-02-21

## 2021-02-25 NOTE — PATIENT INSTRUCTIONS
Thank you for choosing [facility].  You may be receiving an email and/or telephone survey request from [facility] Customer Experience regarding your visit today.  Please take a few minutes to respond to the survey to let us know how we are doing.      If you have questions or concerns, please contact us via [employer] or you can contact your care team at [phone].    Our Clinic hours are:  Monday 6:40 am  to 7:00 pm  Tuesday -Friday 6:40 am to 5:00 pm    The [place] outpatient lab hours are:  Monday - Friday 6:10 am to 4:45 pm  Saturdays 7:00 am to 11:00 am  Appointments are required, call [phone]    If you have clinical questions after hours or would like to schedule an appointment,  call the clinic at [phone].

## 2021-02-25 NOTE — TELEPHONE ENCOUNTER
He can't use advil anymore for pain. He has RA and osteoarthritis. He tries to stay active. He also has afib and had a hip replacement recently. Appt made for a phone visit today.  Alondra Lopez RN

## 2021-02-25 NOTE — PROGRESS NOTES
Tip is a 60 year old who is being evaluated via a billable telephone visit.      What phone number would you like to be contacted at? [phone]  How would you like to obtain your AVS? Candace Whelan is a 60 year old who presents for the following health issues  accompanied by himself:    HPI       Chief Complaint   Patient presents with     Medication Problem     He is not able to take Advil anymore for his RA and osteoarthritis pain due to being on a blood thinner, Eliquis 5 mg twice daily.  Discuss about other pain medication options he can take or if he can change to something other than Eliquis for the blood thinner.   He has been trying to take the Tylenol in place of the Advil and that is not helping with his pain.     Current Outpatient Medications   Medication Instructions     ELIQUIS ANTICOAGULANT 5 mg, Oral, 2 TIMES DAILY     lisinopril (ZESTRIL) 20 MG tablet TAKE 1 TABLET BY MOUTH ONCE DAILY     MULTI-DAY VITAMINS OR TABS 1 daily     omeprazole (PRILOSEC) 40 MG DR capsule TAKE 1 CAPSULE BY MOUTH ONCE DAILY 30-60 MINUTES BEFORE A MEAL     sotalol (BETAPACE) 80 MG tablet 1 tablet, Oral, 2 TIMES DAILY     TYLENOL 500 MG OR TABS 2 tabs [id number]       Patient Active Problem List   Diagnosis     Essential hypertension, benign     Obesity     Hyperlipidemia with target LDL less than 130     RA (rheumatoid arthritis) (H)     Osteoarthritis     Eczema     DJD (degenerative joint disease), lumbar     DJD (degenerative joint disease) of cervical spine     GERD (gastroesophageal reflux disease)     Primary osteoarthritis of right ankle     Primary osteoarthritis of left knee     (I48.20) Chronic atrial fibrillation (H)  (primary encounter diagnosis)  Comment:   Plan: he is on Eliquis from Cardiology to prevent clots. He cannot now use NSAIDs   For the higher risk of bleeding. See below.     (M17.12) Primary osteoarthritis of left knee  Comment:   Plan: we discussed the arthritis. His hands  and knees have the most symptoms.   We discussed the non drug therapies.  Modify activities and use ice and Tylenol.   Take more frequent rest periods. If desired, consideration for cortisone injections could   Be done and he will recheck in clinic with me for that.       Danial Fitzpatrick MD            Phone call duration: 14 minutes

## 2021-03-18 ENCOUNTER — TRANSFERRED RECORDS (OUTPATIENT)
Dept: HEALTH INFORMATION MANAGEMENT | Facility: CLINIC | Age: 61
End: 2021-03-18

## 2021-08-12 ENCOUNTER — TRANSFERRED RECORDS (OUTPATIENT)
Dept: HEALTH INFORMATION MANAGEMENT | Facility: CLINIC | Age: 61
End: 2021-08-12

## 2021-09-07 ENCOUNTER — TELEPHONE (OUTPATIENT)
Dept: FAMILY MEDICINE | Facility: CLINIC | Age: 61
End: 2021-09-07

## 2021-09-07 DIAGNOSIS — K21.9 GASTROESOPHAGEAL REFLUX DISEASE WITHOUT ESOPHAGITIS: ICD-10-CM

## 2021-09-07 RX ORDER — OMEPRAZOLE 40 MG/1
40 CAPSULE, DELAYED RELEASE ORAL DAILY
Qty: 30 CAPSULE | Refills: 0 | Status: SHIPPED | OUTPATIENT
Start: 2021-09-07

## 2021-09-07 NOTE — TELEPHONE ENCOUNTER
Sent 30 days.  Notify patient will need appt for this high of dose for omeprazole and refills or to decrease dose.  Thank you,  Iban May MD

## 2021-09-09 NOTE — TELEPHONE ENCOUNTER
Left message for patient 3rd time to call us back in the clinic.    Mary Munson on 9/9/2021 at 9:49 AM

## 2021-10-23 ENCOUNTER — HEALTH MAINTENANCE LETTER (OUTPATIENT)
Age: 61
End: 2021-10-23

## 2021-12-02 ENCOUNTER — TRANSFERRED RECORDS (OUTPATIENT)
Dept: HEALTH INFORMATION MANAGEMENT | Facility: CLINIC | Age: 61
End: 2021-12-02
Payer: COMMERCIAL

## 2021-12-16 ENCOUNTER — TRANSFERRED RECORDS (OUTPATIENT)
Dept: HEALTH INFORMATION MANAGEMENT | Facility: CLINIC | Age: 61
End: 2021-12-16
Payer: COMMERCIAL

## 2022-02-10 ENCOUNTER — TRANSFERRED RECORDS (OUTPATIENT)
Dept: HEALTH INFORMATION MANAGEMENT | Facility: CLINIC | Age: 62
End: 2022-02-10
Payer: COMMERCIAL

## 2022-02-12 ENCOUNTER — HEALTH MAINTENANCE LETTER (OUTPATIENT)
Age: 62
End: 2022-02-12

## 2022-06-02 NOTE — TELEPHONE ENCOUNTER
Patient is requesting a call today, Wed, 2/19/2020. He is leaving out of town, tomorrow, Thu, 2/20/2020. Daja Frances on 2/19/2020 at 7:57 AM       normal (ped)...

## 2022-10-09 ENCOUNTER — HEALTH MAINTENANCE LETTER (OUTPATIENT)
Age: 62
End: 2022-10-09

## 2023-02-18 ENCOUNTER — HEALTH MAINTENANCE LETTER (OUTPATIENT)
Age: 63
End: 2023-02-18

## 2023-05-27 ENCOUNTER — HEALTH MAINTENANCE LETTER (OUTPATIENT)
Age: 63
End: 2023-05-27